# Patient Record
Sex: FEMALE | Race: OTHER | HISPANIC OR LATINO | URBAN - METROPOLITAN AREA
[De-identification: names, ages, dates, MRNs, and addresses within clinical notes are randomized per-mention and may not be internally consistent; named-entity substitution may affect disease eponyms.]

---

## 2017-06-29 ENCOUNTER — EMERGENCY (EMERGENCY)
Facility: HOSPITAL | Age: 82
LOS: 1 days | Discharge: PSYCHIATRIC OR SUBSTANCE USE FACILITY | End: 2017-06-29
Attending: EMERGENCY MEDICINE | Admitting: HOSPITALIST
Payer: MEDICAID

## 2017-06-29 VITALS
OXYGEN SATURATION: 100 % | SYSTOLIC BLOOD PRESSURE: 186 MMHG | TEMPERATURE: 99 F | RESPIRATION RATE: 18 BRPM | DIASTOLIC BLOOD PRESSURE: 87 MMHG | HEART RATE: 64 BPM

## 2017-06-29 DIAGNOSIS — Z87.898 PERSONAL HISTORY OF OTHER SPECIFIED CONDITIONS: Chronic | ICD-10-CM

## 2017-06-29 DIAGNOSIS — Z90.49 ACQUIRED ABSENCE OF OTHER SPECIFIED PARTS OF DIGESTIVE TRACT: Chronic | ICD-10-CM

## 2017-06-29 LAB
ALBUMIN SERPL ELPH-MCNC: 4.4 G/DL — SIGNIFICANT CHANGE UP (ref 3.3–5)
ALP SERPL-CCNC: 72 U/L — SIGNIFICANT CHANGE UP (ref 40–120)
ALT FLD-CCNC: 11 U/L — SIGNIFICANT CHANGE UP (ref 4–33)
APTT BLD: 36.8 SEC — SIGNIFICANT CHANGE UP (ref 27.5–37.4)
AST SERPL-CCNC: 18 U/L — SIGNIFICANT CHANGE UP (ref 4–32)
BASOPHILS # BLD AUTO: 0.02 K/UL — SIGNIFICANT CHANGE UP (ref 0–0.2)
BASOPHILS NFR BLD AUTO: 0.4 % — SIGNIFICANT CHANGE UP (ref 0–2)
BILIRUB SERPL-MCNC: 1.4 MG/DL — HIGH (ref 0.2–1.2)
BUN SERPL-MCNC: 12 MG/DL — SIGNIFICANT CHANGE UP (ref 7–23)
CALCIUM SERPL-MCNC: 9.2 MG/DL — SIGNIFICANT CHANGE UP (ref 8.4–10.5)
CHLORIDE SERPL-SCNC: 106 MMOL/L — SIGNIFICANT CHANGE UP (ref 98–107)
CO2 SERPL-SCNC: 26 MMOL/L — SIGNIFICANT CHANGE UP (ref 22–31)
CREAT SERPL-MCNC: 0.75 MG/DL — SIGNIFICANT CHANGE UP (ref 0.5–1.3)
EOSINOPHIL # BLD AUTO: 0.16 K/UL — SIGNIFICANT CHANGE UP (ref 0–0.5)
EOSINOPHIL NFR BLD AUTO: 3.1 % — SIGNIFICANT CHANGE UP (ref 0–6)
GLUCOSE SERPL-MCNC: 110 MG/DL — HIGH (ref 70–99)
HCT VFR BLD CALC: 39.6 % — SIGNIFICANT CHANGE UP (ref 34.5–45)
HGB BLD-MCNC: 13.3 G/DL — SIGNIFICANT CHANGE UP (ref 11.5–15.5)
IMM GRANULOCYTES # BLD AUTO: 0.02 # — SIGNIFICANT CHANGE UP
IMM GRANULOCYTES NFR BLD AUTO: 0.4 % — SIGNIFICANT CHANGE UP (ref 0–1.5)
INR BLD: 0.96 — SIGNIFICANT CHANGE UP (ref 0.88–1.17)
LYMPHOCYTES # BLD AUTO: 1.93 K/UL — SIGNIFICANT CHANGE UP (ref 1–3.3)
LYMPHOCYTES # BLD AUTO: 37.9 % — SIGNIFICANT CHANGE UP (ref 13–44)
MCHC RBC-ENTMCNC: 33.6 % — SIGNIFICANT CHANGE UP (ref 32–36)
MCHC RBC-ENTMCNC: 34 PG — SIGNIFICANT CHANGE UP (ref 27–34)
MCV RBC AUTO: 101.3 FL — HIGH (ref 80–100)
MONOCYTES # BLD AUTO: 0.56 K/UL — SIGNIFICANT CHANGE UP (ref 0–0.9)
MONOCYTES NFR BLD AUTO: 11 % — SIGNIFICANT CHANGE UP (ref 2–14)
NEUTROPHILS # BLD AUTO: 2.4 K/UL — SIGNIFICANT CHANGE UP (ref 1.8–7.4)
NEUTROPHILS NFR BLD AUTO: 47.2 % — SIGNIFICANT CHANGE UP (ref 43–77)
NRBC # FLD: 0 — SIGNIFICANT CHANGE UP
PLATELET # BLD AUTO: 145 K/UL — LOW (ref 150–400)
PMV BLD: 10.7 FL — SIGNIFICANT CHANGE UP (ref 7–13)
POTASSIUM SERPL-MCNC: 4.4 MMOL/L — SIGNIFICANT CHANGE UP (ref 3.5–5.3)
POTASSIUM SERPL-SCNC: 4.4 MMOL/L — SIGNIFICANT CHANGE UP (ref 3.5–5.3)
PROT SERPL-MCNC: 7.1 G/DL — SIGNIFICANT CHANGE UP (ref 6–8.3)
PROTHROM AB SERPL-ACNC: 10.8 SEC — SIGNIFICANT CHANGE UP (ref 9.8–13.1)
RBC # BLD: 3.91 M/UL — SIGNIFICANT CHANGE UP (ref 3.8–5.2)
RBC # FLD: 12.3 % — SIGNIFICANT CHANGE UP (ref 10.3–14.5)
SODIUM SERPL-SCNC: 145 MMOL/L — SIGNIFICANT CHANGE UP (ref 135–145)
TSH SERPL-MCNC: 2.87 UIU/ML — SIGNIFICANT CHANGE UP (ref 0.27–4.2)
WBC # BLD: 5.09 K/UL — SIGNIFICANT CHANGE UP (ref 3.8–10.5)
WBC # FLD AUTO: 5.09 K/UL — SIGNIFICANT CHANGE UP (ref 3.8–10.5)

## 2017-06-29 PROCEDURE — 99285 EMERGENCY DEPT VISIT HI MDM: CPT

## 2017-06-29 NOTE — ED PROVIDER NOTE - PROGRESS NOTE DETAILS
Pt stable. VSS. Imaging appreciated. Pt to be admitted to hospitalist. Neuro surg aware of patient. MAR and Dr. Burroughs aware.  Isaias Nielsen MD PGY-3

## 2017-06-29 NOTE — ED ADULT TRIAGE NOTE - CHIEF COMPLAINT QUOTE
dif seeing    pt has hx of pituitary tumor that is pressing on optic nerve...has some blurriness at baseline and episodes of worsened blurriness that usually resolve spontaneously... woke up today at 5am with blurriness that has not resolved...saw neurologist who sent her to ed ...requesting surgery.  has multiple neuro complaints - right sided weakness, headaches, b/l flank pain.

## 2017-06-29 NOTE — ED PROVIDER NOTE - ATTENDING CONTRIBUTION TO CARE
I performed a face-to-face evaluation of the patient and performed a history and physical examination. I agree with the history and physical examination.    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 P/w slowly-progressive decreased vision and general top-of-head HA. No trauma or F. Decreased appetite and early satiety. Appears well. Pupils reactive, but says she sees only light w/ either eye; can't make out fine shapes. Strong pulse. Respirations unlabored. No LE edema. Concern for symptomatic pituitary tumor. Check head CT for alternate explanation (eg, hydrocephalus). Admit.

## 2017-06-29 NOTE — ED ADULT NURSE NOTE - OBJECTIVE STATEMENT
Pt 82y female presents to ED c/o blurry vision. Pt Azeri speaking, family at bedside to translate. As per family pt has been dx with pituitary tumor that is pressing on the optic nerve, as per pt's family pt had it removed 20+ years ago, since then they said it has been growing each year but has recently pt has been having symptoms from the pressure on the optic nerve. Pt also c/o pain on right side of head, describes pain as "burning sensation", 8/10. IV place 20G in left AC, labs drawn and sent, will continue to monitor.

## 2017-06-29 NOTE — ED PROVIDER NOTE - OBJECTIVE STATEMENT
81yo female with TIA no residual deficit and pituitary tumor, depression presents with vision changes. Pt for the past few months has been losing her vision and this morning could not see anything. Pt went to her neurologist today and was sent to ED for pituitary tumor eval. Pt states she only see shadows. Pt also with ha, frontal parietal and loss of appetite. Pt denies cp/sob/palp, abd pain/v/d/n, urinary symptoms, head trauma, loc.

## 2017-06-29 NOTE — ED PROVIDER NOTE - MEDICAL DECISION MAKING DETAILS
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 8/16, Neuro said no surgery until Sx. P/w slowly-progressive decreased vision and general top-of-head HA. No trauma or F. Decreased appetite and early satiety. Appears well. Pupils reactive, but says she sees only light w/ either eye; can't make out fine shapes. Strong pulse. Respirations unlabored. No LE edema. Concern for symptomatic pituitary tumor. Check head CT for alternate explanation (eg, hydrocephalus). Admit.

## 2017-06-29 NOTE — ED ADULT NURSE NOTE - CHPI ED SYMPTOMS NEG
no loss of consciousness/no change in level of consciousness/no syncope/no numbness/no vomiting/no fever/no nausea/no weakness/no chills

## 2017-06-30 DIAGNOSIS — H54.7 UNSPECIFIED VISUAL LOSS: ICD-10-CM

## 2017-06-30 DIAGNOSIS — E23.7 DISORDER OF PITUITARY GLAND, UNSPECIFIED: ICD-10-CM

## 2017-06-30 DIAGNOSIS — Z29.9 ENCOUNTER FOR PROPHYLACTIC MEASURES, UNSPECIFIED: ICD-10-CM

## 2017-06-30 DIAGNOSIS — F32.9 MAJOR DEPRESSIVE DISORDER, SINGLE EPISODE, UNSPECIFIED: ICD-10-CM

## 2017-06-30 LAB
CORTIS SERPL-MCNC: 6.8 UG/DL — SIGNIFICANT CHANGE UP (ref 2.7–18.4)
FSH SERPL-MCNC: 22.3 IU/L — SIGNIFICANT CHANGE UP
LH SERPL-ACNC: 8.7 IU/L — SIGNIFICANT CHANGE UP
PROLACTIN SERPL-MCNC: 6.4 NG/ML — SIGNIFICANT CHANGE UP (ref 3.4–24.1)
PROLACTIN SERPL-MCNC: 8.2 NG/ML — SIGNIFICANT CHANGE UP (ref 3.4–24.1)
T3 SERPL-MCNC: 86.4 NG/DL — SIGNIFICANT CHANGE UP (ref 80–200)
T4 AB SER-ACNC: 5.27 UG/DL — SIGNIFICANT CHANGE UP (ref 5.1–13)

## 2017-06-30 RX ORDER — LANOLIN ALCOHOL/MO/W.PET/CERES
6 CREAM (GRAM) TOPICAL ONCE
Qty: 0 | Refills: 0 | Status: COMPLETED | OUTPATIENT
Start: 2017-06-30 | End: 2017-06-30

## 2017-06-30 RX ORDER — POLYETHYLENE GLYCOL 3350 17 G/17G
17 POWDER, FOR SOLUTION ORAL DAILY
Qty: 0 | Refills: 0 | Status: DISCONTINUED | OUTPATIENT
Start: 2017-06-30 | End: 2017-07-01

## 2017-06-30 RX ORDER — ACETAMINOPHEN 500 MG
650 TABLET ORAL ONCE
Qty: 0 | Refills: 0 | Status: COMPLETED | OUTPATIENT
Start: 2017-06-30 | End: 2017-06-30

## 2017-06-30 RX ORDER — HEPARIN SODIUM 5000 [USP'U]/ML
5000 INJECTION INTRAVENOUS; SUBCUTANEOUS EVERY 8 HOURS
Qty: 0 | Refills: 0 | Status: DISCONTINUED | OUTPATIENT
Start: 2017-06-30 | End: 2017-07-01

## 2017-06-30 RX ORDER — SENNA PLUS 8.6 MG/1
2 TABLET ORAL AT BEDTIME
Qty: 0 | Refills: 0 | Status: DISCONTINUED | OUTPATIENT
Start: 2017-06-30 | End: 2017-07-01

## 2017-06-30 RX ORDER — LANOLIN ALCOHOL/MO/W.PET/CERES
5 CREAM (GRAM) TOPICAL ONCE
Qty: 0 | Refills: 0 | Status: DISCONTINUED | OUTPATIENT
Start: 2017-06-30 | End: 2017-06-30

## 2017-06-30 RX ORDER — LANOLIN ALCOHOL/MO/W.PET/CERES
3 CREAM (GRAM) TOPICAL AT BEDTIME
Qty: 0 | Refills: 0 | Status: DISCONTINUED | OUTPATIENT
Start: 2017-06-30 | End: 2017-07-01

## 2017-06-30 RX ORDER — DOCUSATE SODIUM 100 MG
100 CAPSULE ORAL THREE TIMES A DAY
Qty: 0 | Refills: 0 | Status: DISCONTINUED | OUTPATIENT
Start: 2017-06-30 | End: 2017-07-01

## 2017-06-30 RX ORDER — ACETAMINOPHEN 500 MG
650 TABLET ORAL EVERY 6 HOURS
Qty: 0 | Refills: 0 | Status: DISCONTINUED | OUTPATIENT
Start: 2017-06-30 | End: 2017-07-01

## 2017-06-30 RX ORDER — ESCITALOPRAM OXALATE 10 MG/1
10 TABLET, FILM COATED ORAL DAILY
Qty: 0 | Refills: 0 | Status: DISCONTINUED | OUTPATIENT
Start: 2017-06-30 | End: 2017-07-01

## 2017-06-30 RX ORDER — DEXAMETHASONE 0.5 MG/5ML
10 ELIXIR ORAL ONCE
Qty: 0 | Refills: 0 | Status: COMPLETED | OUTPATIENT
Start: 2017-06-30 | End: 2017-06-30

## 2017-06-30 RX ADMIN — POLYETHYLENE GLYCOL 3350 17 GRAM(S): 17 POWDER, FOR SOLUTION ORAL at 13:18

## 2017-06-30 RX ADMIN — HEPARIN SODIUM 5000 UNIT(S): 5000 INJECTION INTRAVENOUS; SUBCUTANEOUS at 21:15

## 2017-06-30 RX ADMIN — Medication 102 MILLIGRAM(S): at 07:43

## 2017-06-30 RX ADMIN — ESCITALOPRAM OXALATE 10 MILLIGRAM(S): 10 TABLET, FILM COATED ORAL at 13:18

## 2017-06-30 RX ADMIN — Medication 100 MILLIGRAM(S): at 13:19

## 2017-06-30 RX ADMIN — Medication 6 MILLIGRAM(S): at 01:31

## 2017-06-30 RX ADMIN — HEPARIN SODIUM 5000 UNIT(S): 5000 INJECTION INTRAVENOUS; SUBCUTANEOUS at 13:18

## 2017-06-30 RX ADMIN — Medication 650 MILLIGRAM(S): at 05:07

## 2017-06-30 RX ADMIN — Medication 650 MILLIGRAM(S): at 04:44

## 2017-06-30 RX ADMIN — Medication 100 MILLIGRAM(S): at 21:14

## 2017-06-30 NOTE — H&P ADULT - NSHPPHYSICALEXAM_GEN_ALL_CORE
PHYSICAL EXAM:    Constitutional: Elderly female in NAD, lying in gurney.  Eyes: Unable to count fingers held at arms distance from face, unable to see provider's face, unable to read sign. Pupils reactive to light, does blink to threat bilaterally.   ENMT: MMM  Neck: supple  Back: spine non-tender  Respiratory: no respiratory distress on RA, CTAB  Cardiovascular: S1S2 RRR  Gastrointestinal: soft, non-tender +BS   Extremities: No LE edema, no cyanosis  Neurological: Follows commands, conversant in Ugandan, moving all extremities, able to ambulate with assistance, reports decreased sensation to light touch bilateral lower extremities  Skin: no rash  Psychiatric: calm

## 2017-06-30 NOTE — H&P ADULT - PROBLEM SELECTOR PLAN 3
- denies suicidal ideation  - will need to clarify what antidepressant she has been taking, does not know her pharmacy either...  - can attempt melatonin for insomnia

## 2017-06-30 NOTE — H&P ADULT - PROBLEM SELECTOR PLAN 1
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

## 2017-06-30 NOTE — H&P ADULT - HISTORY OF PRESENT ILLNESS
History obtained with assistance of Emirati telephone .     83 yo F with depression and progressive visual loss in setting of pituitary mass presenting with complaints of headaches and loss of vision, sent in by her neurologist for further evaluation. Reports that she has been having headaches for the last three months, localizes them to the front/top of her head, occurring "often", without associated nausea or vomiting, no alleviating or worsening factors, described as a "strange" burning sensation. Reports numbness to her knees bilaterally, but without weakness, no bowel/bladder incontinence. States her vision has also worsened, has been bad for approximately 1 year and 3 months, states things are "not clear", unable to see objects a short distance from her. States mass has been there for a while, but that doctors "in [place]" did not want to do anything about it. Lives with daughter, states ambulating OK.     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

## 2017-06-30 NOTE — H&P ADULT - NSHPLABSRESULTS_GEN_ALL_CORE
Labs:             13.3   5.09  )-----------( 145      ( 29 Jun 2017 22:15 )             39.6     06-29    145  |  106  |  12  ----------------------------<  110<H>  4.4   |  26  |  0.75    Ca    9.2      29 Jun 2017 22:15    TPro  7.1  /  Alb  4.4  /  TBili  1.4<H>  /  DBili  x   /  AST  18  /  ALT  11  /  AlkPhos  72  06-29    PT/INR - ( 29 Jun 2017 22:15 )   PT: 10.8 SEC;   INR: 0.96       PTT - ( 29 Jun 2017 22:15 )  PTT:36.8 SEC    Imaging:  < from: CT Head No Cont (06.29.17 @ 23:14) >    EXAM:  CT BRAIN      PROCEDURE DATE:  Jun 29 2017     INTERPRETATION:  CLINICAL INFORMATION: Visualized, history of pituitary   macroadenoma    TECHNIQUE: Noncontrast axial CT images were acquired through the head.   Two-dimensional sagittal and coronal reformats were generated.    COMPARISON STUDY: CT head 8/22/2016, MRI brain 8/24/2016.    FINDINGS: Again noted is a bilobed, partially calcified mass in the   sella/suprasellar region, displacing the optic chiasm superiorly. The   mass is essentially stable, measuring 2.3 x 1.6 x 3.0 cm (2.2 x 1.4 x 2.5   cm previously).  There is no acute intracranial hemorrhage, large   cortical infarct or midline shift. There are stable periventricular and   subcortical white matter lucencies, likely representing chronic   microvascular ischemic changes. Stable mild to moderate cerebral volume   loss with commensurate ventricular dilatation.    There is no displaced skull fracture. There is minimal mucosal   thickening. The tympanomastoid region is unremarkable. There is no   significant interval change.     IMPRESSION:     Sellar/suprasellar mass as described, unchanged since 8/22/2016. If   clinically indicated, follow-up contrast enhanced MRI would be helpful to   further evaluation.    < end of copied text >    Reviewed: Pituitary mass displacing optic chiasm, per read appears stable compared to prior imaging. Chronic microvascular ischemic changes.

## 2017-06-30 NOTE — PHYSICAL THERAPY INITIAL EVALUATION ADULT - PERTINENT HX OF CURRENT PROBLEM, REHAB EVAL
This is an 83 y/o F with depression and progressive visual loss in setting of pituitary mass presenting with complaints of headaches and loss of vision

## 2017-06-30 NOTE — PHYSICAL THERAPY INITIAL EVALUATION ADULT - GENERAL OBSERVATIONS, REHAB EVAL
Patient received semi supine in bed, in NAD ,Czech speaking ,but able to follow all demonstrations well without any c/o pain or discomfort.

## 2017-06-30 NOTE — CHART NOTE - NSCHARTNOTEFT_GEN_A_CORE
Able to reach pharmacy, patient picked up lexapro 10 mg recently. No longer on effexor, not on any other medications. Lexapro added....

## 2017-06-30 NOTE — H&P ADULT - ASSESSMENT
83 yo F with depression and progressive visual loss in setting of pituitary mass presenting with complaints of headaches and loss of vision, admitted for further evaluation. With pituitary mass on imaging, no acute neurosurgical intervention planned at this time...

## 2017-06-30 NOTE — CONSULT NOTE ADULT - SUBJECTIVE AND OBJECTIVE BOX
HPI:  81 yo female with PMH of pituitary mass presents for blurry vision and headaches x 3 months.    Pt has a known pituitary mass 22 years ago and underwent resection in [place]. 4 years ago, was told that the mass had grown back and is compressing the optic nerve. Pt was never told of any hormonal deficiencies in the past. Only medications she takes is an anti-depressant and Memantine-Donepezil. Now patient complains of headaches, visual loss and numbness in her knees.     So far hormonal workup showed prolactin 6.4, TSH 2.8, T4 5.27, LH 8.7, FSH 22.3 and cortisol 6.8.      PAST MEDICAL & SURGICAL HISTORY:  Pituitary mass  Depression  Transient cerebral ischemia, unspecified type  History of pituitary tumor  S/P cholecystectomy      FAMILY HISTORY:  No pertinent family history in first degree relatives      Social History: denies smoking or EtOH    Outpatient Medications:  Trintellix, Memantine, Donepezil    MEDICATIONS  (STANDING):  heparin  Injectable 5000 Unit(s) SubCutaneous every 8 hours  docusate sodium 100 milliGRAM(s) Oral three times a day  polyethylene glycol 3350 17 Gram(s) Oral daily  escitalopram 10 milliGRAM(s) Oral daily    MEDICATIONS  (PRN):  acetaminophen   Tablet. 650 milliGRAM(s) Oral every 6 hours PRN Mild Pain (1 - 3)  senna 2 Tablet(s) Oral at bedtime PRN Constipation  melatonin 3 milliGRAM(s) Oral at bedtime PRN Insomnia      Allergies    No Known Allergies    Intolerances      Review of Systems:  Constitutional: No fever  Eyes: +blurry vision  Neuro: No tremors, +HA  HEENT: No pain  Cardiovascular: No chest pain, palpitations  Respiratory: No SOB, no cough  GI: No nausea, vomiting, abdominal pain  : No dysuria  Skin: no rash  Psych: no depression  Endocrine: no polyuria, polydipsia  Hem/lymph: no swelling  Osteoporosis: no fractures    ALL OTHER SYSTEMS REVIEWED AND NEGATIVE    PHYSICAL EXAM:  VITALS: T(C): 36.6 (06-30-17 @ 10:59)  T(F): 97.8 (06-30-17 @ 10:59), Max: 98.8 (06-29-17 @ 20:38)  HR: 63 (06-30-17 @ 10:59) (56 - 68)  BP: 156/97 (06-30-17 @ 10:59) (156/97 - 199/72)  RR:  (16 - 18)  SpO2:  (97% - 100%)  Wt(kg): --  GENERAL: NAD, well-groomed, well-developed  EYES: No proptosis, no lid lag, anicteric  HEENT:  Atraumatic, Normocephalic, moist mucous membranes  THYROID: Normal size, no palpable nodules  RESPIRATORY: Clear to auscultation bilaterally; No rales, rhonchi, wheezing, or rubs  CARDIOVASCULAR: Regular rate and rhythm; No murmurs; no peripheral edema  GI: Soft, nontender, non distended, normal bowel sounds  SKIN: Dry, intact, No rashes or lesions  MUSCULOSKELETAL: Full range of motion, normal strength  NEURO: sensation intact, extraocular movements intact, no tremor, normal reflexes  PSYCH: Alert and oriented x 3, normal affect, normal mood  CUSHING'S SIGNS: no striae    CAPILLARY BLOOD GLUCOSE                            13.3   5.09  )-----------( 145      ( 29 Jun 2017 22:15 )             39.6       06-29    145  |  106  |  12  ----------------------------<  110<H>  4.4   |  26  |  0.75    EGFR if : 86  EGFR if non : 74    Ca    9.2      06-29    TPro  7.1  /  Alb  4.4  /  TBili  1.4<H>  /  DBili  x   /  AST  18  /  ALT  11  /  AlkPhos  72  06-29      Thyroid Function Tests:  06-30 @ 05:20 TSH -- FreeT4 -- T3 86.4 Anti TPO -- Anti Thyroglobulin Ab -- TSI --  06-29 @ 22:15 TSH 2.87 FreeT4 -- T3 -- Anti TPO -- Anti Thyroglobulin Ab -- TSI --        Radiology:     CT Head  FINDINGS: Again noted is a bilobed, partially calcified mass in the   sella/suprasellar region, displacing the optic chiasm superiorly. The   mass isessentially stable, measuring 2.3 x 1.6 x 3.0 cm (2.2 x 1.4 x 2.5   cm previously).     < end of copied text >

## 2017-06-30 NOTE — CONSULT NOTE ADULT - SUBJECTIVE AND OBJECTIVE BOX
83yo female with TIA no residual deficit and pituitary tumor, depression presents with vision changes. Pt for the past few months has been losing her vision and this morning could not see anything. Pt went to her neurologist today and was sent to ED for pituitary tumor eval. Pt states she only see shadows. Pt also with ha, frontal parietal and loss of appetite.    WDWN female in NAD  Vital Signs Last 24 Hrs  T(C): 37.1 (29 Jun 2017 20:38), Max: 37.1 (29 Jun 2017 20:38)  T(F): 98.8 (29 Jun 2017 20:38), Max: 98.8 (29 Jun 2017 20:38)  HR: 64 (29 Jun 2017 20:38) (64 - 64)  BP: 186/87 (29 Jun 2017 20:38) (186/87 - 186/87)  RR: 18 (29 Jun 2017 20:38) (18 - 18)  SpO2: 100% (29 Jun 2017 20:38) (100% - 100%)    AAO X 3 (Korean speaking)  PERRL, able to differentiate between light and dark only  MADRID strength 5/5 X 4                          13.3   5.09  )-----------( 145      ( 29 Jun 2017 22:15 )             39.6   06-29    145  |  106  |  12  ----------------------------<  110<H>  4.4   |  26  |  0.75    Ca    9.2      29 Jun 2017 22:15    TPro  7.1  /  Alb  4.4  /  TBili  1.4<H>  /  DBili  x   /  AST  18  /  ALT  11  /  AlkPhos  72  06-29    TSH 2.87    Noncontrast Brain CT: Again noted is a bilobed, partially calcified mass in the  sella/suprasellar region, displacing the optic chiasm superiorly. The mass  is essentially stable, measuring 2.3 x 1.6 x 3.0 cm (2.2 x 1.4 x 2.5 cm  previously). There is no acute intracranial hemorrhage, large cortical  infarct or midline shift. There are stable periventricular and subcortical  white matter lucencies, likely representing chronic microvascular ischemic  changes. Stable mild to moderate cerebral volume loss with commensurate  ventricular dilatation 81yo female with TIA no residual deficit and pituitary tumor, depression presents with vision changes. After discussing her case in [place]ian it became clear that the patient has been progressively losing her vision for two years. She was diagnosed with a pituitary tumor and did not have it resected because of problems with her insurance. She has only been able to see shadows for over a year.     Pt went to her neurologist today and was sent to ED for pituitary tumor eval. Pt states she only see shadows. Pt also with ha, frontal parietal and loss of appetite.  She has had global headaches for over a year and they are causing her significant distress.    WDWN female in NAD  Vital Signs Last 24 Hrs  T(C): 37.1 (29 Jun 2017 20:38), Max: 37.1 (29 Jun 2017 20:38)  T(F): 98.8 (29 Jun 2017 20:38), Max: 98.8 (29 Jun 2017 20:38)  HR: 64 (29 Jun 2017 20:38) (64 - 64)  BP: 186/87 (29 Jun 2017 20:38) (186/87 - 186/87)  RR: 18 (29 Jun 2017 20:38) (18 - 18)  SpO2: 100% (29 Jun 2017 20:38) (100% - 100%)    AAO X 3 (Danish speaking)  PERRL, able to differentiate between light and dark only  MADRID strength 5/5 X 4                          13.3   5.09  )-----------( 145      ( 29 Jun 2017 22:15 )             39.6   06-29    145  |  106  |  12  ----------------------------<  110<H>  4.4   |  26  |  0.75    Ca    9.2      29 Jun 2017 22:15    TPro  7.1  /  Alb  4.4  /  TBili  1.4<H>  /  DBili  x   /  AST  18  /  ALT  11  /  AlkPhos  72  06-29    TSH 2.87    Noncontrast Brain CT: Again noted is a bilobed, partially calcified mass in the  sella/suprasellar region, displacing the optic chiasm superiorly. The mass  is essentially stable, measuring 2.3 x 1.6 x 3.0 cm (2.2 x 1.4 x 2.5 cm  previously). There is no acute intracranial hemorrhage, large cortical  infarct or midline shift. There are stable periventricular and subcortical  white matter lucencies, likely representing chronic microvascular ischemic  changes. Stable mild to moderate cerebral volume loss with commensurate  ventricular dilatation

## 2017-06-30 NOTE — PROGRESS NOTE ADULT - SUBJECTIVE AND OBJECTIVE BOX
Case and films seen and discussed with Dr Manrique. Patient reportedly has had loss of vision since 2015 when she was diagnosed with this sellar lesion. Per patient she did not have the money or insurance for surgery and withheld getting treatment for this lesion.     PE- awake, alert, affect appropriate, Ox3  No visual fields, can differentiate light, cannot see colors     Will continue to follow,     please obtain endocrine and ophthomology consult  endocrine lab work   CTA w/wo contrast of the head with stereotactic protocol   no OR right now will need to follow closely

## 2017-06-30 NOTE — CONSULT NOTE ADULT - SUBJECTIVE AND OBJECTIVE BOX
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 No other ocular complaints. Have not seen an ophthalmologist.  POH: None  PMH: HTN  VA CF OU (ph 20/200, NI)  Pupils round and poorly reactive OU, no APD  IOP 14 OU  EOM full OU  CVF essentially full OU although difficult to assess given poor VA  PLE  LLA flat OU  C/S W+Q OU  K cl OU  Ac D+F OU  Iris flat OU  Lens NS OU  DFE: OD vitreous traction in macula OD w/o break. Otherwise m/n/v/p wnl OU, CD 0.4 OU, no optic nerve pallor or swelling.    CT: Bilobed, partially calcified mass in the sella/suprasellar region, displacing the optic chiasm superiorly. The mass is essentially stable (from 08/2016), measuring 2.3 x 1.6 x 3.0 cm (2.2 x 1.4 x 2.5 cm previously).  There is no acute intracranial hemorrhage, large cortical infarct or midline shift. 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 No other ocular complaints. Have not seen an ophthalmologist.  POH: None  PMH: HTN  VA CF OU (ph 20/200, NI)  Pupils round and poorly reactive OU, no APD  IOP 14 OU  EOM full OU  CVF essentially full OU although difficult to assess given poor VA  Unable to do color plates d/t poor VA. Red saturation decreased (sees red but describes as faded OU)  PLE  LLA flat OU  C/S W+Q OU  K cl OU  Ac D+F OU  Iris flat OU  Lens NS OU  DFE: OD vitreous traction in macula OD w/o break. Otherwise m/n/v/p wnl OU, CD 0.4 OU, no optic nerve pallor or swelling.    CT: Bilobed, partially calcified mass in the sella/suprasellar region, displacing the optic chiasm superiorly. The mass is essentially stable (from 08/2016), measuring 2.3 x 1.6 x 3.0 cm (2.2 x 1.4 x 2.5 cm previously).  There is no acute intracranial hemorrhage, large cortical infarct or midline shift.

## 2017-06-30 NOTE — H&P ADULT - PROBLEM SELECTOR PLAN 2
- has blurry vision for greater than 1 year in setting of pituitary mass with displacement of optic chiasm, unclear if any intervention will return vision    - f/u neurosx input  - ophthalmology evaluation  - no acute CVA noted on CT scan  - fall precautions

## 2017-06-30 NOTE — CONSULT NOTE ADULT - ASSESSMENT
A: 83 yo F with h/o sella mass for 15 years and gradually decreased vision OU. Has not seen an ophthalmologist. VA is poor.      Consider follow-up contrast enhanced MRI A: 83 yo F with h/o sella mass for 15 years and gradually decreased vision OU. Has not seen an ophthalmologist. VA is poor. No obvious field cuts. Decreased red saturation but normal appearing optic nerves. Area of vitreous traction on macula OD but no tear/hole/detachment.  P: Follow up with neuro-ophthalmology within 1 week of discharge: Dr Gutierrez, [phone]    Blane Mullins  [phone]  PGY2

## 2017-06-30 NOTE — H&P ADULT - NSHPREVIEWOFSYSTEMS_GEN_ALL_CORE
REVIEW OF SYSTEMS  General:	No fever/chills/weakness  Skin/Breast: no rash  Ophthalmologic: + progressive visual loss  ENMT: no dysphagia/sore throat	  Respiratory and Thorax: no cough/SOB  Cardiovascular: no CP/palpitations/LE edema	  Gastrointestinal: no nausea/vomiting/abdominal pain, +constipation	  Genitourinary: no dysuria/no incontinence	  Musculoskeletal: + left wrist and b/l elbow pain	  Neurological: + frequent headaches as per HPI, + numbness in lower extremities, no loss of strength, memory issues	  Psychiatric: + depression (does not recall what agent she is currently on, but reports no longer on venlafaxine), insomnia (takes unknown medicine she gets from [place] for it), denies suicidal ideation 	  Hematology/Lymphatics: no easy bleeding/bruising	  Endocrine: +pituitary mass	  Allergic/Immunologic: NKDA

## 2017-06-30 NOTE — CONSULT NOTE ADULT - ATTENDING COMMENTS
82F noted with pituitary macroadenoma, regrowth s/p prior resection 22 years ago, no known hormonal deficiencies.  Would proceed with MRI, optho eval for possible optic chiasm involvement. Recheck cortisol at 8AM with ACTH and check free T4.

## 2017-06-30 NOTE — CONSULT NOTE ADULT - PROBLEM SELECTOR RECOMMENDATION 9
Obtain prior surgical records, imaging, and pathology reports if possible  Brain and sella MRI with and without contrast  Ophthomology consult  Please send serum prolactin, LH, FSH, GH, IGF-1, AM cortisol, and T3, T4 levels

## 2017-06-30 NOTE — CONSULT NOTE ADULT - PROBLEM SELECTOR RECOMMENDATION 9
- repeat AM cortisol and ACTH  - IGF-1 pending  - consider prolactin dilution level - repeat AM cortisol and ACTH at 8am  - check free thyroxine level in am  - IGF-1 pending

## 2017-07-01 VITALS
HEART RATE: 58 BPM | OXYGEN SATURATION: 97 % | DIASTOLIC BLOOD PRESSURE: 86 MMHG | TEMPERATURE: 98 F | SYSTOLIC BLOOD PRESSURE: 146 MMHG | RESPIRATION RATE: 18 BRPM

## 2017-07-01 DIAGNOSIS — M25.569 PAIN IN UNSPECIFIED KNEE: ICD-10-CM

## 2017-07-01 LAB
BILIRUB DIRECT SERPL-MCNC: 0.3 MG/DL — HIGH (ref 0.1–0.2)
BILIRUB SERPL-MCNC: 2 MG/DL — HIGH (ref 0.2–1.2)
BUN SERPL-MCNC: 12 MG/DL — SIGNIFICANT CHANGE UP (ref 7–23)
CALCIUM SERPL-MCNC: 9.2 MG/DL — SIGNIFICANT CHANGE UP (ref 8.4–10.5)
CHLORIDE SERPL-SCNC: 104 MMOL/L — SIGNIFICANT CHANGE UP (ref 98–107)
CO2 SERPL-SCNC: 22 MMOL/L — SIGNIFICANT CHANGE UP (ref 22–31)
CORTIS SERPL-MCNC: 0.4 UG/DL — LOW (ref 2.7–18.4)
CREAT SERPL-MCNC: 0.63 MG/DL — SIGNIFICANT CHANGE UP (ref 0.5–1.3)
GH SERPL-MCNC: 0.05 NG/ML — SIGNIFICANT CHANGE UP
GLUCOSE SERPL-MCNC: 119 MG/DL — HIGH (ref 70–99)
HCT VFR BLD CALC: 39.1 % — SIGNIFICANT CHANGE UP (ref 34.5–45)
HGB BLD-MCNC: 13.4 G/DL — SIGNIFICANT CHANGE UP (ref 11.5–15.5)
IGF BP1 SERPL-MCNC: 64 NG/ML — SIGNIFICANT CHANGE UP (ref 34–172)
MCHC RBC-ENTMCNC: 33.5 PG — SIGNIFICANT CHANGE UP (ref 27–34)
MCHC RBC-ENTMCNC: 34.3 % — SIGNIFICANT CHANGE UP (ref 32–36)
MCV RBC AUTO: 97.8 FL — SIGNIFICANT CHANGE UP (ref 80–100)
NRBC # FLD: 0 — SIGNIFICANT CHANGE UP
PLATELET # BLD AUTO: 148 K/UL — LOW (ref 150–400)
PMV BLD: 11.3 FL — SIGNIFICANT CHANGE UP (ref 7–13)
POTASSIUM SERPL-MCNC: 3.7 MMOL/L — SIGNIFICANT CHANGE UP (ref 3.5–5.3)
POTASSIUM SERPL-SCNC: 3.7 MMOL/L — SIGNIFICANT CHANGE UP (ref 3.5–5.3)
RBC # BLD: 4 M/UL — SIGNIFICANT CHANGE UP (ref 3.8–5.2)
RBC # FLD: 12 % — SIGNIFICANT CHANGE UP (ref 10.3–14.5)
SODIUM SERPL-SCNC: 141 MMOL/L — SIGNIFICANT CHANGE UP (ref 135–145)
T4 FREE SERPL-MCNC: 1.06 NG/DL — SIGNIFICANT CHANGE UP (ref 0.9–1.8)
WBC # BLD: 10.74 K/UL — HIGH (ref 3.8–10.5)
WBC # FLD AUTO: 10.74 K/UL — HIGH (ref 3.8–10.5)

## 2017-07-01 RX ORDER — ESCITALOPRAM OXALATE 10 MG/1
1 TABLET, FILM COATED ORAL
Qty: 0 | Refills: 0 | COMMUNITY
Start: 2017-07-01

## 2017-07-01 RX ORDER — DOCUSATE SODIUM 100 MG
1 CAPSULE ORAL
Qty: 0 | Refills: 0 | COMMUNITY
Start: 2017-07-01

## 2017-07-01 RX ORDER — SENNA PLUS 8.6 MG/1
2 TABLET ORAL
Qty: 0 | Refills: 0 | COMMUNITY
Start: 2017-07-01

## 2017-07-01 RX ORDER — POLYETHYLENE GLYCOL 3350 17 G/17G
17 POWDER, FOR SOLUTION ORAL
Qty: 0 | Refills: 0 | COMMUNITY
Start: 2017-07-01

## 2017-07-01 RX ADMIN — HEPARIN SODIUM 5000 UNIT(S): 5000 INJECTION INTRAVENOUS; SUBCUTANEOUS at 05:53

## 2017-07-01 RX ADMIN — Medication 100 MILLIGRAM(S): at 05:53

## 2017-07-01 RX ADMIN — Medication 650 MILLIGRAM(S): at 12:06

## 2017-07-01 RX ADMIN — POLYETHYLENE GLYCOL 3350 17 GRAM(S): 17 POWDER, FOR SOLUTION ORAL at 11:35

## 2017-07-01 RX ADMIN — Medication 100 MILLIGRAM(S): at 14:51

## 2017-07-01 RX ADMIN — ESCITALOPRAM OXALATE 10 MILLIGRAM(S): 10 TABLET, FILM COATED ORAL at 11:36

## 2017-07-01 RX ADMIN — Medication 650 MILLIGRAM(S): at 11:36

## 2017-07-01 NOTE — PROGRESS NOTE ADULT - SUBJECTIVE AND OBJECTIVE BOX
Patient can f/u as outpatient, needs to schedule an appointment with opthamologist Dr. Gutierrez, and call on monday to schedule f/u with Dr. Manrique

## 2017-07-01 NOTE — DISCHARGE NOTE ADULT - CARE PROVIDER_API CALL
Sourav Gutierrez), Ophthalmology  [address] [place] [address]  Phone: [phone]  Fax: [phone]    Rayray Manrique), Neurosurgery  General  [address] [place] [address]  Phone: [phone]  Fax: [phone] Sourav Gutierrez), Ophthalmology  [address] [place] [address]  Phone: [phone]  Fax: [phone]    Rayray Manrique), Neurosurgery  General  [address] [place] [address]  Phone: [phone]  Fax: [phone]    ENDO CLINCI,   Phone: [phone]  Fax: (   )    -    ENDO CLINIC,   Phone: [phone]  Fax: (   )    -

## 2017-07-01 NOTE — DISCHARGE NOTE ADULT - CARE PLAN
Principal Discharge DX:	Vision loss  Goal:	continue care with follow up appointment  Instructions for follow-up, activity and diet:	Follow up with neuro-ophthalmology within 1 week of discharge: Dr Gutierrez, [phone]  Secondary Diagnosis:	Pituitary mass  Instructions for follow-up, activity and diet:	MRI revealed Stable sellar and suprasellar mass with mass effect upon the optic chiasm  and optic nerves are compared with prior study dated 8/24/2016    Follow up with Dr. Manrique for further evaluation and management. Please call to make an appointment within 1-2 weeks of discharge. Principal Discharge DX:	Vision loss  Goal:	continue care with follow up appointment  Instructions for follow-up, activity and diet:	Follow up with neuro-ophthalmology within 1 week of discharge: Dr Gutierrez, [phone]  Secondary Diagnosis:	Pituitary mass  Instructions for follow-up, activity and diet:	MRI revealed Stable sellar and suprasellar mass with mass effect upon the optic chiasm  and optic nerves are compared with prior study dated 8/24/2016    Follow up with Dr. Manrique for further evaluation and management. Please call to make an appointment within 1-2 weeks of discharge. Follow up with Endocrine for further evaluation and management. Principal Discharge DX:	Vision loss  Goal:	continue care with follow up appointment  Instructions for follow-up, activity and diet:	Follow up with neuro-ophthalmology within 1 week of discharge: Dr Gutierrez, [phone]  Secondary Diagnosis:	Pituitary mass  Instructions for follow-up, activity and diet:	MRI revealed Stable sellar and suprasellar mass with mass effect upon the optic chiasm  and optic nerves are compared with prior study dated 8/24/2016    Follow up with Dr. Manrique for further evaluation and management. Please call to make an appointment within 1-2 weeks of discharge. Follow up with Endocrine for further evaluation and management.

## 2017-07-01 NOTE — DISCHARGE NOTE ADULT - PROVIDER TOKENS
TOKEN:'[id number]',TOKEN:'[id number]' TOKEN:'[id number]:'[id number]',FREE:[LAST:[ENDO CLINCI],PHONE:[phone]],FAX:[(   )    -]],FREE:[LAST:[ENDO CLINIC],PHONE:[phone]],FAX:[(   )    -]]

## 2017-07-01 NOTE — PROGRESS NOTE ADULT - SUBJECTIVE AND OBJECTIVE BOX
Patient is a 61y old  Male who presents with a chief complaint of failure to thrive (02 Jun 2017 02:09)      SUBJECTIVE / OVERNIGHT EVENTS: no change in vision. denies HA. c/o B/L knee pain ongoing now for 3 years. exam and interview performed with  phone.     MEDICATIONS  (STANDING):  heparin  Injectable 5000 Unit(s) SubCutaneous every 12 hours  ferrous    sulfate 325 milliGRAM(s) Oral daily  folic acid 1 milliGRAM(s) Oral daily  calcium carbonate 1250 mG + Vitamin D (OsCal 500 + D) 1 Tablet(s) Oral two times a day  ascorbic acid 500 milliGRAM(s) Oral daily  multivitamin 1 Tablet(s) Oral daily  aspirin enteric coated 81 milliGRAM(s) Oral daily  dextrose 5%. 1000 milliLiter(s) (50 mL/Hr) IV Continuous <Continuous>  dextrose 50% Injectable 12.5 Gram(s) IV Push once  dextrose 50% Injectable 25 Gram(s) IV Push once  dextrose 50% Injectable 25 Gram(s) IV Push once  levETIRAcetam 500 milliGRAM(s) Oral two times a day  QUEtiapine 50 milliGRAM(s) Oral three times a day  LORazepam     Tablet 2 milliGRAM(s) Oral every 6 hours  senna 2 Tablet(s) Oral at bedtime  docusate sodium 100 milliGRAM(s) Oral three times a day    MEDICATIONS  (PRN):  dextrose Gel 1 Dose(s) Oral once PRN Blood Glucose LESS THAN 70 milliGRAM(s)/deciliter  glucagon  Injectable 1 milliGRAM(s) IntraMuscular once PRN Glucose LESS THAN 70 milligrams/deciliter  acetaminophen   Tablet 650 milliGRAM(s) Oral every 6 hours PRN For Temp greater than 38 C (100.4 F)  bisacodyl Suppository 10 milliGRAM(s) Rectal daily PRN Constipation  LORazepam     Tablet 2 milliGRAM(s) Oral every 6 hours PRN Agitation  LORazepam   Injectable 2 milliGRAM(s) IV Push every 6 hours PRN Agitation      T(C): 36.9 (07-01-17 @ 13:31)  HR: 112 (07-01-17 @ 13:31)  BP: 98/56 (07-01-17 @ 13:31)  RR: 20 (07-01-17 @ 13:31)  SpO2: 100% (07-01-17 @ 13:31)  CAPILLARY BLOOD GLUCOSE  100 (01 Jul 2017 07:41)        I&O's Summary      PHYSICAL EXAM:  GENERAL: NAD, well-developed, AOx3  HEAD:  Atraumatic, Normocephalic  EYES: EOMI, PERRL, conjunctiva and sclera clear  NECK: Supple, No JVD  CHEST/LUNG: Clear to auscultation bilaterally  HEART: Regular rate and rhythm; No murmurs, rubs, or gallops, No Edema  ABDOMEN: Soft, Nontender, Nondistended; Bowel sounds present  EXTREMITIES:  2+ Peripheral Pulses, No clubbing, cyanosis  PSYCH: No SI/HI  NEUROLOGY: non-focal  SKIN: No rashes or lesions    LABS:                        9.2    6.93  )-----------( 393      ( 01 Jul 2017 07:35 )             29.4     07-01    138  |  103  |  19  ----------------------------<  100<H>  4.9   |  20<L>  |  0.85    Ca    8.6      01 Jul 2017 07:35  Phos  3.6     07-01  Mg     2.1     07-01                    RADIOLOGY & ADDITIONAL TESTS:    Imaging Personally Reviewed:    Consultant(s) Notes Reviewed:      Care Discussed with Consultants/Other Providers:

## 2017-07-01 NOTE — DISCHARGE NOTE ADULT - MEDICATION SUMMARY - MEDICATIONS TO TAKE
I will START or STAY ON the medications listed below when I get home from the hospital:    escitalopram 10 mg oral tablet  -- 1 tab(s) by mouth once a day  -- Indication: For Depression    senna oral tablet  -- 2 tab(s) by mouth once a day (at bedtime), As needed, Constipation  -- Indication: For Need for prophylactic measure    docusate sodium 100 mg oral capsule  -- 1 cap(s) by mouth 3 times a day  -- Indication: For Need for prophylactic measure    polyethylene glycol 3350 oral powder for reconstitution  -- 17 gram(s) by mouth once a day  -- Indication: For Need for prophylactic measure

## 2017-07-01 NOTE — PROGRESS NOTE ADULT - PROBLEM SELECTOR PLAN 1
appreciate neurosx, opthal, and endo input. chronic mass effect. needs out-patient w/u. patient however does not have insurance therefore out-patient w/u and possible sx would be problematic. need to d.w family, SW and CM to address insurance issue prior to discharge,

## 2017-07-01 NOTE — DISCHARGE NOTE ADULT - PATIENT PORTAL LINK FT
“You can access the Follow[employer] Patient Portal, offered by [facility], by registering with the following website: [email or web address]”

## 2017-07-01 NOTE — DISCHARGE NOTE ADULT - PLAN OF CARE
Follow up with neuro-ophthalmology within 1 week of discharge: Dr Gutierrez, [phone] continue care with follow up appointment MRI revealed Stable sellar and suprasellar mass with mass effect upon the optic chiasm  and optic nerves are compared with prior study dated 8/24/2016    Follow up with Dr. Manrique for further evaluation and management. Please call to make an appointment within 1-2 weeks of discharge. MRI revealed Stable sellar and suprasellar mass with mass effect upon the optic chiasm  and optic nerves are compared with prior study dated 8/24/2016    Follow up with Dr. Manrique for further evaluation and management. Please call to make an appointment within 1-2 weeks of discharge. Follow up with Endocrine for further evaluation and management.

## 2017-07-01 NOTE — PROGRESS NOTE ADULT - ASSESSMENT
82 F with hx of pituitary mass s.p resection 22 years ago, with vision disturbances x 3 years found With recurrent sellar and suprasellar mass with mass effect on optic chiasm.

## 2017-07-01 NOTE — DISCHARGE NOTE ADULT - CARE PROVIDERS DIRECT ADDRESSES
,[email or web address],DirectAddress_Unknown,DirectAddress_Unknown

## 2017-07-01 NOTE — DISCHARGE NOTE ADULT - HOSPITAL COURSE
83 yo F with depression and progressive visual loss in setting of pituitary mass presenting with complaints of headaches and loss of vision, admitted for further evaluation. With pituitary mass on imaging, no acute neurosurgical intervention planned at this time.    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 To see Dr. Manrique in office    Vision loss.   - has blurry vision for greater than 1 year in setting of pituitary mass with displacement of optic chiasm, unclear if any intervention will return vision    - ophthalmology evaluation to follow up with Dr. Gutierrez as outpatient  - no acute CVA noted on CT scan  - fall precautions.     Depression.    - denies suicidal ideation    Pt stable for d/c home. Family aware of importance of follow up with Maria Luisa from medicaid office on Monday, appointment was set up for 7/3 on 6/30.

## 2017-07-03 LAB — ACTH SER-ACNC: 15 PG/ML — SIGNIFICANT CHANGE UP (ref 0–46)

## 2017-07-13 RX ORDER — ESCITALOPRAM OXALATE 10 MG/1
1 TABLET, FILM COATED ORAL
Qty: 0 | Refills: 0 | COMMUNITY

## 2017-07-15 PROBLEM — F32.9 MAJOR DEPRESSIVE DISORDER, SINGLE EPISODE, UNSPECIFIED: Chronic | Status: ACTIVE | Noted: 2017-06-29

## 2017-07-18 ENCOUNTER — INPATIENT (INPATIENT)
Facility: HOSPITAL | Age: 82
LOS: 7 days | Discharge: HOME | DRG: 614 | End: 2017-07-26
Attending: STUDENT IN AN ORGANIZED HEALTH CARE EDUCATION/TRAINING PROGRAM | Admitting: STUDENT IN AN ORGANIZED HEALTH CARE EDUCATION/TRAINING PROGRAM
Payer: MEDICAID

## 2017-07-18 VITALS
SYSTOLIC BLOOD PRESSURE: 123 MMHG | RESPIRATION RATE: 18 BRPM | DIASTOLIC BLOOD PRESSURE: 75 MMHG | OXYGEN SATURATION: 97 % | HEART RATE: 58 BPM | TEMPERATURE: 98 F

## 2017-07-18 DIAGNOSIS — E23.7 DISORDER OF PITUITARY GLAND, UNSPECIFIED: ICD-10-CM

## 2017-07-18 DIAGNOSIS — Z90.49 ACQUIRED ABSENCE OF OTHER SPECIFIED PARTS OF DIGESTIVE TRACT: Chronic | ICD-10-CM

## 2017-07-18 DIAGNOSIS — Z87.898 PERSONAL HISTORY OF OTHER SPECIFIED CONDITIONS: Chronic | ICD-10-CM

## 2017-07-18 LAB
ALBUMIN SERPL ELPH-MCNC: 4 G/DL — SIGNIFICANT CHANGE UP (ref 3.3–5)
ALP SERPL-CCNC: 67 U/L — SIGNIFICANT CHANGE UP (ref 40–120)
ALT FLD-CCNC: 12 U/L RC — SIGNIFICANT CHANGE UP (ref 10–45)
ANION GAP SERPL CALC-SCNC: 12 MMOL/L — SIGNIFICANT CHANGE UP (ref 5–17)
APTT BLD: 35.2 SEC — SIGNIFICANT CHANGE UP (ref 27.5–37.4)
AST SERPL-CCNC: 16 U/L — SIGNIFICANT CHANGE UP (ref 10–40)
BASOPHILS # BLD AUTO: 0 K/UL — SIGNIFICANT CHANGE UP (ref 0–0.2)
BASOPHILS NFR BLD AUTO: 0.4 % — SIGNIFICANT CHANGE UP (ref 0–2)
BILIRUB SERPL-MCNC: 1.4 MG/DL — HIGH (ref 0.2–1.2)
BLD GP AB SCN SERPL QL: NEGATIVE — SIGNIFICANT CHANGE UP
BUN SERPL-MCNC: 15 MG/DL — SIGNIFICANT CHANGE UP (ref 7–23)
CALCIUM SERPL-MCNC: 9 MG/DL — SIGNIFICANT CHANGE UP (ref 8.4–10.5)
CHLORIDE SERPL-SCNC: 108 MMOL/L — SIGNIFICANT CHANGE UP (ref 96–108)
CO2 SERPL-SCNC: 24 MMOL/L — SIGNIFICANT CHANGE UP (ref 22–31)
CREAT SERPL-MCNC: 0.79 MG/DL — SIGNIFICANT CHANGE UP (ref 0.5–1.3)
EOSINOPHIL # BLD AUTO: 0.1 K/UL — SIGNIFICANT CHANGE UP (ref 0–0.5)
EOSINOPHIL NFR BLD AUTO: 1.9 % — SIGNIFICANT CHANGE UP (ref 0–6)
GLUCOSE SERPL-MCNC: 100 MG/DL — HIGH (ref 70–99)
HCT VFR BLD CALC: 37.9 % — SIGNIFICANT CHANGE UP (ref 34.5–45)
HGB BLD-MCNC: 13.2 G/DL — SIGNIFICANT CHANGE UP (ref 11.5–15.5)
INR BLD: 1.03 RATIO — SIGNIFICANT CHANGE UP (ref 0.88–1.16)
LYMPHOCYTES # BLD AUTO: 1.7 K/UL — SIGNIFICANT CHANGE UP (ref 1–3.3)
LYMPHOCYTES # BLD AUTO: 26.8 % — SIGNIFICANT CHANGE UP (ref 13–44)
MAGNESIUM SERPL-MCNC: 2.1 MG/DL — SIGNIFICANT CHANGE UP (ref 1.6–2.6)
MCHC RBC-ENTMCNC: 34.9 GM/DL — SIGNIFICANT CHANGE UP (ref 32–36)
MCHC RBC-ENTMCNC: 36 PG — HIGH (ref 27–34)
MCV RBC AUTO: 103 FL — HIGH (ref 80–100)
MONOCYTES # BLD AUTO: 0.6 K/UL — SIGNIFICANT CHANGE UP (ref 0–0.9)
MONOCYTES NFR BLD AUTO: 9.6 % — SIGNIFICANT CHANGE UP (ref 2–14)
NEUTROPHILS # BLD AUTO: 3.8 K/UL — SIGNIFICANT CHANGE UP (ref 1.8–7.4)
NEUTROPHILS NFR BLD AUTO: 61.2 % — SIGNIFICANT CHANGE UP (ref 43–77)
PHOSPHATE SERPL-MCNC: 3.3 MG/DL — SIGNIFICANT CHANGE UP (ref 2.5–4.5)
PLATELET # BLD AUTO: 143 K/UL — LOW (ref 150–400)
POTASSIUM SERPL-MCNC: 4.4 MMOL/L — SIGNIFICANT CHANGE UP (ref 3.5–5.3)
POTASSIUM SERPL-SCNC: 4.4 MMOL/L — SIGNIFICANT CHANGE UP (ref 3.5–5.3)
PROT SERPL-MCNC: 6.6 G/DL — SIGNIFICANT CHANGE UP (ref 6–8.3)
PROTHROM AB SERPL-ACNC: 11.2 SEC — SIGNIFICANT CHANGE UP (ref 9.8–12.7)
RBC # BLD: 3.67 M/UL — LOW (ref 3.8–5.2)
RBC # FLD: 11.5 % — SIGNIFICANT CHANGE UP (ref 10.3–14.5)
RH IG SCN BLD-IMP: POSITIVE — SIGNIFICANT CHANGE UP
SODIUM SERPL-SCNC: 144 MMOL/L — SIGNIFICANT CHANGE UP (ref 135–145)
WBC # BLD: 6.2 K/UL — SIGNIFICANT CHANGE UP (ref 3.8–10.5)
WBC # FLD AUTO: 6.2 K/UL — SIGNIFICANT CHANGE UP (ref 3.8–10.5)

## 2017-07-18 PROCEDURE — 99223 1ST HOSP IP/OBS HIGH 75: CPT

## 2017-07-18 PROCEDURE — 99285 EMERGENCY DEPT VISIT HI MDM: CPT

## 2017-07-18 PROCEDURE — 70553 MRI BRAIN STEM W/O & W/DYE: CPT | Mod: 26

## 2017-07-18 PROCEDURE — 70450 CT HEAD/BRAIN W/O DYE: CPT | Mod: 26

## 2017-07-18 RX ORDER — DIPHENHYDRAMINE HCL 50 MG
50 CAPSULE ORAL EVERY 4 HOURS
Qty: 0 | Refills: 0 | Status: DISCONTINUED | OUTPATIENT
Start: 2017-07-18 | End: 2017-07-20

## 2017-07-18 RX ORDER — POLYETHYLENE GLYCOL 3350 17 G/17G
17 POWDER, FOR SOLUTION ORAL DAILY
Qty: 0 | Refills: 0 | Status: DISCONTINUED | OUTPATIENT
Start: 2017-07-18 | End: 2017-07-20

## 2017-07-18 RX ORDER — SENNA PLUS 8.6 MG/1
2 TABLET ORAL AT BEDTIME
Qty: 0 | Refills: 0 | Status: DISCONTINUED | OUTPATIENT
Start: 2017-07-18 | End: 2017-07-20

## 2017-07-18 RX ORDER — SENNA PLUS 8.6 MG/1
2 TABLET ORAL AT BEDTIME
Qty: 0 | Refills: 0 | Status: DISCONTINUED | OUTPATIENT
Start: 2017-07-18 | End: 2017-07-18

## 2017-07-18 RX ORDER — ESCITALOPRAM OXALATE 10 MG/1
10 TABLET, FILM COATED ORAL DAILY
Qty: 0 | Refills: 0 | Status: DISCONTINUED | OUTPATIENT
Start: 2017-07-18 | End: 2017-07-20

## 2017-07-18 RX ORDER — DEXAMETHASONE 0.5 MG/5ML
10 ELIXIR ORAL EVERY 6 HOURS
Qty: 0 | Refills: 0 | Status: DISCONTINUED | OUTPATIENT
Start: 2017-07-18 | End: 2017-07-20

## 2017-07-18 RX ORDER — ACETAMINOPHEN 500 MG
650 TABLET ORAL EVERY 6 HOURS
Qty: 0 | Refills: 0 | Status: DISCONTINUED | OUTPATIENT
Start: 2017-07-18 | End: 2017-07-20

## 2017-07-18 RX ORDER — HYDRALAZINE HCL 50 MG
5 TABLET ORAL ONCE
Qty: 0 | Refills: 0 | Status: COMPLETED | OUTPATIENT
Start: 2017-07-18 | End: 2017-07-18

## 2017-07-18 RX ORDER — ZOLPIDEM TARTRATE 10 MG/1
5 TABLET ORAL AT BEDTIME
Qty: 0 | Refills: 0 | Status: DISCONTINUED | OUTPATIENT
Start: 2017-07-18 | End: 2017-07-18

## 2017-07-18 RX ORDER — ONDANSETRON 8 MG/1
4 TABLET, FILM COATED ORAL EVERY 6 HOURS
Qty: 0 | Refills: 0 | Status: DISCONTINUED | OUTPATIENT
Start: 2017-07-18 | End: 2017-07-20

## 2017-07-18 RX ORDER — DOCUSATE SODIUM 100 MG
100 CAPSULE ORAL THREE TIMES A DAY
Qty: 0 | Refills: 0 | Status: DISCONTINUED | OUTPATIENT
Start: 2017-07-18 | End: 2017-07-18

## 2017-07-18 RX ORDER — DOCUSATE SODIUM 100 MG
100 CAPSULE ORAL THREE TIMES A DAY
Qty: 0 | Refills: 0 | Status: DISCONTINUED | OUTPATIENT
Start: 2017-07-18 | End: 2017-07-20

## 2017-07-18 RX ADMIN — Medication 650 MILLIGRAM(S): at 17:55

## 2017-07-18 RX ADMIN — Medication 100 MILLIGRAM(S): at 22:07

## 2017-07-18 RX ADMIN — Medication 5 MILLIGRAM(S): at 21:00

## 2017-07-18 RX ADMIN — Medication 50 MILLIGRAM(S): at 23:33

## 2017-07-18 NOTE — ED PROVIDER NOTE - PHYSICAL EXAMINATION
Mahnaz Mathias M.D.:   patient awake alert seen lying on stretcher NAD .   LUNGS CTAB no wheeze no crackle.   CARD RRR no m/r/g.    Abdomen soft NT ND no rebound no guarding no CVA tenderness.   EXT WWP no edema no calf tenderness CV 2+DP/PT bilaterally.   neuro A&Ox3 generalized weakness in all extremities. senssensation intact. CN 3-12intact.    skin warm and dry no rash  HEENT: moist mucous membranes, pupils 2mm sluggish, EOMI

## 2017-07-18 NOTE — H&P ADULT - HISTORY OF PRESENT ILLNESS
p ([phone])     HPI:Mandi Vasques Welsh speaking 82 year old female s/p tsp for pituitary tumor 20 years ago. History of TIA several months ago, takes ASA 81 daily.  Presents with worsening vision.  Baseline was blurry vision but claims she can now only see shadows in the past 2 weeks in both eyes. No acute HA. History provided by grandson and daughter.    PAST MEDICAL HISTORY   Pituitary mass  Depression  Transient cerebral ischemia, unspecified type    PAST SURGICAL HISTORY   History of pituitary tumor  S/P cholecystectomy  No significant past surgical history        MEDICATIONS:  Anticoagulation: ASA 81 mg daily    Other:    REVIEW OF SYSTEMS:  Check here if all are normal other than Neurological [x]  General:  Eyes:  ENT:  Cardiac:  Respiratory:  GI:  Musculoskeletal:   Skin:  Neurologic:   Psychiatric:     PHYSICAL EXAMINATION:   T(C): 36.5 (07-18-17 @ 13:35), Max: 36.5 (07-18-17 @ 13:35)  HR: 58 (07-18-17 @ 13:35) (58 - 58)  BP: 123/75 (07-18-17 @ 13:35) (123/75 - 123/75)  RR: 18 (07-18-17 @ 13:35) (18 - 18)  SpO2: 97% (07-18-17 @ 13:35) (97% - 97%)  Wt(kg): --    General Examination:     Neurologic Examination:             Higher functions                 Normal [x]               Abnormal:      Cranial Nerves (ii-xii)           Normal []              Abnormal: 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, but sensitive to light bilaterally    Motor Exam                       Normal [x]              Abnormal:                               Sensory Exam                   Normal [x]              Abnormal:    Reflexes                            Normal [x]              Abnormal:     Coordination                      Normal [x]              Abnormal:    Other:     LABS:                        13.2   6.2   )-----------( 143      ( 18 Jul 2017 14:32 )             37.9     07-18    144  |  108  |  15  ----------------------------<  100<H>  4.4   |  24  |  0.79    Ca    9.0      18 Jul 2017 14:32  Phos  3.3     07-18  Mg     2.1     07-18    TPro  6.6  /  Alb  4.0  /  TBili  1.4<H>  /  DBili  x   /  AST  16  /  ALT  12  /  AlkPhos  67  07-18    PT/INR - ( 18 Jul 2017 14:32 )   PT: 11.2 sec;   INR: 1.03 ratio         PTT - ( 18 Jul 2017 14:32 )  PTT:35.2 sec      RADIOLOGY & ADDITIONAL STUDIES:  MRI head 6/30/17: 2.6x2.3x1.7 pituitary mass, unchanged from 8/2016 read.

## 2017-07-18 NOTE — ED ADULT NURSE NOTE - CHPI ED SYMPTOMS NEG
no vomiting/no fever/no weakness/no dizziness/no change in level of consciousness/no confusion/no loss of consciousness/no numbness/no nausea

## 2017-07-18 NOTE — ED ADULT NURSE REASSESSMENT NOTE - NS ED NURSE REASSESS COMMENT FT1
17.15 Pt is admitted has no bed  report given to Arabella Melendrez in Holding pt is stable to go to holding at thi time of care

## 2017-07-18 NOTE — ED PROVIDER NOTE - PROGRESS NOTE DETAILS
Pt remains stable in ED; per d/w neurosurgery service, patient to be admitted to their service for further management.  Robson

## 2017-07-18 NOTE — ED PROVIDER NOTE - SHIFT CHANGE DETAILS
I have received sign out on this patient, briefly: 81yo F with h/o pituitary tumor s/p resection and recurrence, presenting to ED with worsening vision; currently awaiting CT head and neurosurgery consultation; disposition pending imaging and d/w neurosurgery consultant.  Robson

## 2017-07-18 NOTE — ED ADULT NURSE NOTE - EENT WDL
Eyes with  visual disturbances.  Ears clean and dry and no hearing difficulties. Nose with pink mucosa and no drainage.  Mouth mucous membranes moist and pink.  No tenderness or swelling to throat or neck.

## 2017-07-18 NOTE — ED PROVIDER NOTE - ATTENDING CONTRIBUTION TO CARE
Attending MD Parrish:  I personally have seen and examined this patient.  Resident note reviewed and agree on plan of care and except where noted.  See HPI, PE, and MDM for details.       82F with pituitary mass presenting with progressive vision loss x 1 mo, plan for screening CT head, neurosurgery consult

## 2017-07-18 NOTE — ED ADULT NURSE NOTE - OBJECTIVE STATEMENT
Pt with known H/O pituitary tumor C/O Vision loss.. Pt had  pituitary tumor which was excised couple of years ago and she had better vision. over the few months her vision is deteriorating and now to the point she is unable to see anything and a per her family the tumor is regrowing. so her  neurologist called the pt here for possible  surgery & management . Pt is unable to see anything from both eyes. Right pupil size 2  reactive to light  left pupil sluggish to light &  size  2

## 2017-07-18 NOTE — H&P ADULT - ASSESSMENT
Mandi Vasques82 year old female with known pituitary tumor with worsening vision.    -Admit to [facility]  -Hold ASA/AC  -MRI with and without  -endocrine labs  -Surgery planned for thursday Mandi Vasques82 year old female with known pituitary tumor with worsening vision.    -Admit to [facility]  -Hold ASA/AC  -MRI with and without  -endocrine labs  -Surgery planned for thursday  -ophtho eval

## 2017-07-18 NOTE — ED PROVIDER NOTE - OBJECTIVE STATEMENT
Mahnaz Mathias M.D: 82yoF hx depression, pituitary tumor s/p resection 20yrsago, recurred 6 months ago, followed by Dr. Manrique ([facility]) p/w progressively worsening vision loss and weakness over the last few weeks. was told by [facility] 3 weeks ago to return to ER when symptoms worsened. pt states she can only see shadows now has no true visual acuity.

## 2017-07-18 NOTE — ED PROVIDER NOTE - MEDICAL DECISION MAKING DETAILS
Mahnaz Mathias M.D: 82yoF w. hx pituitary tumor w/ known optic N compression, p/w worsening vision and weakness. pt w/ poor-minimal visual acuity. willl obtain pre-op labs, ct head, NSG consult, dispo per NSG. concern for worsening tumor burden

## 2017-07-19 DIAGNOSIS — E23.7 DISORDER OF PITUITARY GLAND, UNSPECIFIED: ICD-10-CM

## 2017-07-19 DIAGNOSIS — R17 UNSPECIFIED JAUNDICE: ICD-10-CM

## 2017-07-19 DIAGNOSIS — F32.9 MAJOR DEPRESSIVE DISORDER, SINGLE EPISODE, UNSPECIFIED: ICD-10-CM

## 2017-07-19 DIAGNOSIS — D49.7 NEOPLASM OF UNSPECIFIED BEHAVIOR OF ENDOCRINE GLANDS AND OTHER PARTS OF NERVOUS SYSTEM: ICD-10-CM

## 2017-07-19 LAB
ACTH SER-ACNC: 19 PG/ML — SIGNIFICANT CHANGE UP (ref 0–46)
ANION GAP SERPL CALC-SCNC: 18 MMOL/L — HIGH (ref 5–17)
BASOPHILS # BLD AUTO: 0.01 K/UL — SIGNIFICANT CHANGE UP (ref 0–0.2)
BASOPHILS NFR BLD AUTO: 0.2 % — SIGNIFICANT CHANGE UP (ref 0–2)
CHLORIDE SERPL-SCNC: 108 MMOL/L — SIGNIFICANT CHANGE UP (ref 96–108)
CO2 SERPL-SCNC: 19 MMOL/L — LOW (ref 22–31)
CORTIS AM PEAK SERPL-MCNC: 2.3 UG/DL — LOW (ref 6–18.4)
EOSINOPHIL # BLD AUTO: 0 K/UL — SIGNIFICANT CHANGE UP (ref 0–0.5)
EOSINOPHIL NFR BLD AUTO: 0 % — SIGNIFICANT CHANGE UP (ref 0–6)
FSH SERPL-MCNC: 20.4 IU/L — SIGNIFICANT CHANGE UP
HCT VFR BLD CALC: 41.8 % — SIGNIFICANT CHANGE UP (ref 34.5–45)
HGB BLD-MCNC: 14.1 G/DL — SIGNIFICANT CHANGE UP (ref 11.5–15.5)
IMM GRANULOCYTES NFR BLD AUTO: 0 % — SIGNIFICANT CHANGE UP (ref 0–1.5)
LH SERPL-ACNC: 9.5 IU/L — SIGNIFICANT CHANGE UP
LYMPHOCYTES # BLD AUTO: 0.67 K/UL — LOW (ref 1–3.3)
LYMPHOCYTES # BLD AUTO: 14.8 % — SIGNIFICANT CHANGE UP (ref 13–44)
MCHC RBC-ENTMCNC: 33.5 PG — SIGNIFICANT CHANGE UP (ref 27–34)
MCHC RBC-ENTMCNC: 33.7 GM/DL — SIGNIFICANT CHANGE UP (ref 32–36)
MCV RBC AUTO: 99.3 FL — SIGNIFICANT CHANGE UP (ref 80–100)
MONOCYTES # BLD AUTO: 0.06 K/UL — SIGNIFICANT CHANGE UP (ref 0–0.9)
MONOCYTES NFR BLD AUTO: 1.3 % — LOW (ref 2–14)
NEUTROPHILS # BLD AUTO: 3.79 K/UL — SIGNIFICANT CHANGE UP (ref 1.8–7.4)
NEUTROPHILS NFR BLD AUTO: 83.7 % — HIGH (ref 43–77)
PLATELET # BLD AUTO: 170 K/UL — SIGNIFICANT CHANGE UP (ref 150–400)
POTASSIUM SERPL-MCNC: 4.4 MMOL/L — SIGNIFICANT CHANGE UP (ref 3.5–5.3)
POTASSIUM SERPL-SCNC: 4.4 MMOL/L — SIGNIFICANT CHANGE UP (ref 3.5–5.3)
PROLACTIN SERPL-MCNC: 9.5 NG/ML — SIGNIFICANT CHANGE UP (ref 3.4–24.1)
RBC # BLD: 4.21 M/UL — SIGNIFICANT CHANGE UP (ref 3.8–5.2)
RBC # FLD: 12.9 % — SIGNIFICANT CHANGE UP (ref 10.3–14.5)
SODIUM SERPL-SCNC: 145 MMOL/L — SIGNIFICANT CHANGE UP (ref 135–145)
TSH SERPL-MCNC: 1.92 UIU/ML — SIGNIFICANT CHANGE UP (ref 0.27–4.2)
WBC # BLD: 4.53 K/UL — SIGNIFICANT CHANGE UP (ref 3.8–10.5)
WBC # FLD AUTO: 4.53 K/UL — SIGNIFICANT CHANGE UP (ref 3.8–10.5)

## 2017-07-19 PROCEDURE — 99222 1ST HOSP IP/OBS MODERATE 55: CPT | Mod: GC

## 2017-07-19 PROCEDURE — 93010 ELECTROCARDIOGRAM REPORT: CPT

## 2017-07-19 PROCEDURE — 99223 1ST HOSP IP/OBS HIGH 75: CPT

## 2017-07-19 RX ORDER — SODIUM CHLORIDE 9 MG/ML
1000 INJECTION INTRAMUSCULAR; INTRAVENOUS; SUBCUTANEOUS
Qty: 0 | Refills: 0 | Status: DISCONTINUED | OUTPATIENT
Start: 2017-07-19 | End: 2017-07-20

## 2017-07-19 RX ORDER — PANTOPRAZOLE SODIUM 20 MG/1
40 TABLET, DELAYED RELEASE ORAL DAILY
Qty: 0 | Refills: 0 | Status: DISCONTINUED | OUTPATIENT
Start: 2017-07-19 | End: 2017-07-20

## 2017-07-19 RX ORDER — INSULIN LISPRO 100/ML
VIAL (ML) SUBCUTANEOUS
Qty: 0 | Refills: 0 | Status: DISCONTINUED | OUTPATIENT
Start: 2017-07-19 | End: 2017-07-20

## 2017-07-19 RX ADMIN — ESCITALOPRAM OXALATE 10 MILLIGRAM(S): 10 TABLET, FILM COATED ORAL at 14:50

## 2017-07-19 RX ADMIN — Medication 10 MILLIGRAM(S): at 15:06

## 2017-07-19 RX ADMIN — Medication 116 MILLIGRAM(S): at 00:10

## 2017-07-19 RX ADMIN — PANTOPRAZOLE SODIUM 40 MILLIGRAM(S): 20 TABLET, DELAYED RELEASE ORAL at 14:49

## 2017-07-19 RX ADMIN — Medication 10 MILLIGRAM(S): at 05:46

## 2017-07-19 RX ADMIN — POLYETHYLENE GLYCOL 3350 17 GRAM(S): 17 POWDER, FOR SOLUTION ORAL at 14:50

## 2017-07-19 RX ADMIN — Medication 100 MILLIGRAM(S): at 14:50

## 2017-07-19 RX ADMIN — Medication 100 MILLIGRAM(S): at 05:46

## 2017-07-19 RX ADMIN — Medication 2: at 22:58

## 2017-07-19 RX ADMIN — Medication 50 MILLIGRAM(S): at 23:45

## 2017-07-19 RX ADMIN — Medication 100 MILLIGRAM(S): at 22:58

## 2017-07-19 NOTE — PROGRESS NOTE ADULT - ASSESSMENT
ASSESSMENT AND PLAN: 82 year old female with prior surgical history of TSP removal 20 years ago. Presented overnight with worsening vision. Pt is only able to asses light.    Brain MRI done shows supracellar mass with mass effect on th optic chiasm and invasion of right cavernous sinus.     NEURO: Pt is scheduled for surgery with Dr. Manrique in AM     CV: Hx of TIA. was previously on ASA - held for preop     ENDO: Pt started on decadron 10Q6 for brain swelling.  To continue until OR in AM     HEME/ONC:                      DVT ppx: No SQL until after OR    GI:  Continue senna, colace and protonix added for GI ppx with high dose steroids.     DISCHARGE PLANNING: PT-p post OR ASSESSMENT AND PLAN: 82 year old female with prior surgical history of TSP removal 20 years ago. Presented overnight with worsening vision. Pt is only able to asses light.    Brain MRI done shows supracellar mass with mass effect on th optic chiasm and invasion of right cavernous sinus. Endocrine labs done LH, FSH , Prolactin WNL     NEURO: Pt is scheduled for surgery with Dr. Manrique in AM . Opthalmology consult pending.     CV: Hx of TIA. was previously on ASA - held for preop     ENDO: Pt started on decadron 10Q6 for brain swelling.  To continue until OR in AM. HISS added. Endo called for consult.      HEME/ONC:                      DVT ppx: No SQL until after OR    GI:  Continue senna, colace and protonix added for GI ppx with high dose steroids. NPO after midnight start IV fluids once NPO.     DISCHARGE PLANNING: PT-p post OR ASSESSMENT AND PLAN: 82 year old female with prior surgical history of TSP removal 20 years ago. Presented overnight with worsening vision. Pt is only able to asses light.    Brain MRI done shows supracellar mass with mass effect on th optic chiasm and invasion of right cavernous sinus. Endocrine labs done LH, FSH , Prolactin WNL. AM cortisol 2.3    NEURO: Pt is scheduled for surgery with Dr. Manrique in AM . Opthalmology consult pending.     CV: Hx of TIA. was previously on ASA - held for preop     ENDO: Pt started on decadron 10Q6 for brain swelling.  To continue until OR in AM. HISS added. Endo called for consult.      HEME/ONC:                      DVT ppx: No SQL until after OR    GI:  Continue senna, colace and protonix added for GI ppx with high dose steroids. NPO after midnight start IV fluids once NPO.     DISCHARGE PLANNING: PT-p post OR

## 2017-07-19 NOTE — CONSULT NOTE ADULT - SUBJECTIVE AND OBJECTIVE BOX
HPI:  82 year old W s/p tsp for pituitary tumor 20 years ago presents with worsening vision. Patient has had poor vision for past 3 years but it has worsened over past 2 weeks. Patient had MRI 6/2017 2.6X2.3X1.7cm pituitary mass unchanged from 8/2016.  MRI on admission revealed sellar mass compressing optic chiasm and nerves.       PAST MEDICAL HISTORY   Pituitary mass  Depression  Transient cerebral ischemia, unspecified type    PAST SURGICAL HISTORY   History of pituitary tumor  S/P cholecystectomy  No significant past surgical history                    PAST MEDICAL & SURGICAL HISTORY:  Pituitary mass  Depression  Transient cerebral ischemia, unspecified type  History of pituitary tumor  S/P cholecystectomy      FAMILY HISTORY:  No pertinent family history in first degree relatives      Social History:     Outpatient Medications:    MEDICATIONS  (STANDING):  polyethylene glycol 3350 17 Gram(s) Oral daily  docusate sodium 100 milliGRAM(s) Oral three times a day  escitalopram 10 milliGRAM(s) Oral daily  dexamethasone   Concentrate 10 milliGRAM(s) Oral every 6 hours  pantoprazole  Injectable 40 milliGRAM(s) IV Push daily  sodium chloride 0.9%. 1000 milliLiter(s) (60 mL/Hr) IV Continuous <Continuous>  insulin lispro (HumaLOG) corrective regimen sliding scale   SubCutaneous Before meals and at bedtime    MEDICATIONS  (PRN):  senna 2 Tablet(s) Oral at bedtime PRN Constipation  acetaminophen   Tablet 650 milliGRAM(s) Oral every 6 hours PRN For Temp greater than 38 C (100.4 F)  acetaminophen   Tablet. 650 milliGRAM(s) Oral every 6 hours PRN Mild Pain (1 - 3)  ondansetron Injectable 4 milliGRAM(s) IV Push every 6 hours PRN Nausea and/or Vomiting  diphenhydrAMINE   Capsule 50 milliGRAM(s) Oral every 4 hours PRN Insomnia      Allergies    No Known Allergies    Intolerances      Review of Systems:  Constitutional: No fever  Eyes: No blurry vision  Neuro: No tremors  HEENT: No pain  Cardiovascular: No chest pain, palpitations  Respiratory: No SOB, no cough  GI: No nausea, vomiting, abdominal pain  : No dysuria  Skin: no rash  Psych: no depression  Endocrine: no polyuria, polydipsia  Hem/lymph: no swelling  Osteoporosis: no fractures    ALL OTHER SYSTEMS REVIEWED AND NEGATIVE    UNABLE TO OBTAIN    PHYSICAL EXAM:  VITALS: T(C): 37.1 (07-19-17 @ 15:48)  T(F): 98.8 (07-19-17 @ 15:48), Max: 98.9 (07-19-17 @ 05:25)  HR: 72 (07-19-17 @ 15:48) (54 - 72)  BP: 126/69 (07-19-17 @ 15:48) (120/67 - 174/83)  RR:  (18 - 18)  SpO2:  (92% - 98%)  Wt(kg): --  GENERAL: NAD, well-groomed, well-developed  EYES: No proptosis, no lid lag, anicteric  HEENT:  Atraumatic, Normocephalic, moist mucous membranes  THYROID: Normal size, no palpable nodules  RESPIRATORY: Clear to auscultation bilaterally; No rales, rhonchi, wheezing, or rubs  CARDIOVASCULAR: Regular rate and rhythm; No murmurs; no peripheral edema  GI: Soft, nontender, non distended, normal bowel sounds  SKIN: Dry, intact, No rashes or lesions  MUSCULOSKELETAL: Full range of motion, normal strength  NEURO: sensation intact, extraocular movements intact, no tremor, normal reflexes  PSYCH: Alert and oriented x 3, normal affect, normal mood  CUSHING'S SIGNS: no striae    CAPILLARY BLOOD GLUCOSE                            14.1   4.53  )-----------( 170      ( 19 Jul 2017 08:31 )             41.8       07-19    145  |  108  |  x   ----------------------------<  x   4.4   |  19<L>  |  x     EGFR if : x   EGFR if non : x     Ca    9.0      07-18  Mg     2.1     07-18  Phos  3.3     07-18    TPro  6.6  /  Alb  4.0  /  TBili  1.4<H>  /  DBili  x   /  AST  16  /  ALT  12  /  AlkPhos  67  07-18      Thyroid Function Tests:  07-18 @ 20:08 TSH 1.92 FreeT4 -- T3 -- Anti TPO -- Anti Thyroglobulin Ab -- TSI --  07-01 @ 07:00 TSH -- FreeT4 1.06 T3 -- Anti TPO -- Anti Thyroglobulin Ab -- TSI --              Radiology: HPI:  82 year old W s/p tsp for pituitary tumor 20 years ago presents with worsening vision. Patient has had poor vision for past 3 years but it has worsened over past 2 weeks. Patient had MRI 6/2017 2.6X2.3X1.7cm pituitary mass unchanged from 8/2016.  MRI on admission revealed sellar mass compressing optic chiasm and nerves. Patient denies ever being on previous hormone replacement.      PAST MEDICAL HISTORY   Pituitary mass  Depression  Transient cerebral ischemia, unspecified type    PAST SURGICAL HISTORY   History of pituitary tumor  S/P cholecystectomy  No significant past surgical history      PAST MEDICAL & SURGICAL HISTORY:  Pituitary mass  Depression  Transient cerebral ischemia, unspecified type  History of pituitary tumor  S/P cholecystectomy      FAMILY HISTORY:  No pertinent family history in first degree relatives      Social History: Live alone    Outpatient Medications: Escitalopram    MEDICATIONS  (STANDING):  polyethylene glycol 3350 17 Gram(s) Oral daily  docusate sodium 100 milliGRAM(s) Oral three times a day  escitalopram 10 milliGRAM(s) Oral daily  dexamethasone   Concentrate 10 milliGRAM(s) Oral every 6 hours  pantoprazole  Injectable 40 milliGRAM(s) IV Push daily  sodium chloride 0.9%. 1000 milliLiter(s) (60 mL/Hr) IV Continuous <Continuous>  insulin lispro (HumaLOG) corrective regimen sliding scale   SubCutaneous Before meals and at bedtime    MEDICATIONS  (PRN):  senna 2 Tablet(s) Oral at bedtime PRN Constipation  acetaminophen   Tablet 650 milliGRAM(s) Oral every 6 hours PRN For Temp greater than 38 C (100.4 F)  acetaminophen   Tablet. 650 milliGRAM(s) Oral every 6 hours PRN Mild Pain (1 - 3)  ondansetron Injectable 4 milliGRAM(s) IV Push every 6 hours PRN Nausea and/or Vomiting  diphenhydrAMINE   Capsule 50 milliGRAM(s) Oral every 4 hours PRN Insomnia      Allergies    No Known Allergies    Intolerances      Review of Systems:  Constitutional: No fever  Eyes: + blurry vision +headache  Neuro: No tremors  HEENT: No pain  Cardiovascular: No chest pain, palpitations  Respiratory: No SOB, no cough  GI: No nausea, vomiting, abdominal pain  : No dysuria  Skin: no rash  Psych: no depression  Endocrine: no polyuria, polydipsia  Hem/lymph: no swelling  Osteoporosis: no fractures    ALL OTHER SYSTEMS REVIEWED AND NEGATIVE      PHYSICAL EXAM:  VITALS: T(C): 37.1 (07-19-17 @ 15:48)  T(F): 98.8 (07-19-17 @ 15:48), Max: 98.9 (07-19-17 @ 05:25)  HR: 72 (07-19-17 @ 15:48) (54 - 72)  BP: 126/69 (07-19-17 @ 15:48) (120/67 - 174/83)  RR:  (18 - 18)  SpO2:  (92% - 98%)  Wt(kg): --  GENERAL: NAD, well-groomed, well-developed  EYES: No proptosis, no lid lag, anicteric  HEENT:  Atraumatic, Normocephalic, moist mucous membranes  THYROID: Normal size, no palpable nodules  RESPIRATORY: Clear to auscultation bilaterally; No rales, rhonchi, wheezing, or rubs  CARDIOVASCULAR: Regular rate and rhythm; No murmurs; no peripheral edema  GI: Soft, nontender, non distended, normal bowel sounds  SKIN: Dry, intact, No rashes or lesions  MUSCULOSKELETAL: Full range of motion, normal strength  NEURO: sensation intact, extraocular movements intact, no tremor, normal reflexes  PSYCH: Alert and oriented x 3, normal affect, normal mood  CUSHING'S SIGNS: no striae    CAPILLARY BLOOD GLUCOSE                            14.1   4.53  )-----------( 170      ( 19 Jul 2017 08:31 )             41.8       07-19    145  |  108  |  x   ----------------------------<  x   4.4   |  19<L>  |  x     EGFR if : x   EGFR if non : x     Ca    9.0      07-18  Mg     2.1     07-18  Phos  3.3     07-18    TPro  6.6  /  Alb  4.0  /  TBili  1.4<H>  /  DBili  x   /  AST  16  /  ALT  12  /  AlkPhos  67  07-18      Thyroid Function Tests:  07-18 @ 20:08 TSH 1.92 FreeT4 -- T3 -- Anti TPO -- Anti Thyroglobulin Ab -- TSI --  07-01 @ 07:00 TSH -- FreeT4 1.06 T3 -- Anti TPO -- Anti Thyroglobulin Ab -- TSI --    AM cortisol 2.3  FT4 1.06  TSH 1.9  ACTH 15  FSH 20.4  LH 95  Prolactin 9.5  IGF-1 64

## 2017-07-19 NOTE — CONSULT NOTE ADULT - SUBJECTIVE AND OBJECTIVE BOX
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 No other ocular complaints. Scheduled for sellar mass excision tomorrow.  POH: None  PMH: HTN  NKDA    VA HM (ph NI)  Pupils round and poorly reactive OU, no APD  IOP 14 OU  EOM full OU  CVF OD inferior field cut, OS superotemporal quadrantanopia (unreliable 2/2 poor co-operation)  Red saturation decreased (sees red but describes as faded OU)  PLE  LLA flat OU  C/S W+Q OU  K cl OU  Ac D+F OU  Iris flat OU  Lens NS OU  DFE: OD vitreous traction in macula OD w/o break. Otherwise m/n/v/p wnl OU, CD 0.4 OU, no optic nerve pallor or swelling.    CT: Bilobed, partially calcified mass in the sella/suprasellar region, displacing the optic chiasm superiorly. The mass is essentially stable (from 08/2016), measuring 2.3 x 1.6 x 3.0 cm (2.2 x 1.4 x 2.5 cm previously).  There is no acute intracranial hemorrhage, large cortical infarct or midline shift. 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 No other ocular complaints. Scheduled for sellar mass excision tomorrow.    POH: None  PMH: HTN  NKDA    VA HM (ph NI)  Pupils round and poorly reactive OU, no APD OU  IOP 14 OU  EOM full OU  CVF OD inferior field cut, OS superotemporal quadrantanopia (unreliable 2/2 poor co-operation)  Red saturation decreased (sees red but describes as faded OU)  PLE  LLA flat OU  C/S W+Q OU  K cl OU  Ac D+F OU  Iris flat OU  Lens NS OU  DFE: OD vitreous traction in macula OD. Otherwise m/n/v/p wnl OU, CD 0.4 OU, no optic nerve pallor or swelling.    CT: Bilobed, partially calcified mass in the sella/suprasellar region, displacing the optic chiasm superiorly. The mass is essentially stable (from 08/2016), measuring 2.3 x 1.6 x 3.0 cm (2.2 x 1.4 x 2.5 cm previously).  There is no acute intracranial hemorrhage, large cortical infarct or midline shift.

## 2017-07-19 NOTE — CONSULT NOTE ADULT - ASSESSMENT
82F with hx pituitary tumor s/p resection 20 yrs ago with recurrence in 2015, TIA on aspirin, depression, a/w worsening vision.

## 2017-07-19 NOTE — CONSULT NOTE ADULT - SUBJECTIVE AND OBJECTIVE BOX
82F with hx pituitary tumor s/p resection 20 yrs ago with recurrence in 2015 has been experiencing changes in vision for the last HPI:  82F with hx pituitary tumor s/p resection 20 yrs ago with recurrence in 2015, TIA on aspirin, depression, has been experiencing changes in vision for the past few months but worsening in the last 2 weeks. Patient was hospitalized at [facility] in 6/2017 found to have stable sellar and suprasellar mass with mass effect upon the optic chiasm and optic nerves are compared with prior study dated 8/24/2016 on MRI brain. Patient was discharged home at that time with plans to follow up closely as outpatient. Per cristina, patient has diminished vision and mostly sees "shadow" but c/o not being able to see on the day of admission. Patient also has been having associated dizziness and unsteady gait. Patient endorses intermittent HA, chronic back, knee, hand and feet pain from arthritis. Denies chest pain, SOB, nausea, difficulty with urination or bowel movement.     Neuro: Dr. Glenda Sapp([facility]): [phone]    PAST MEDICAL & SURGICAL HISTORY:  Pituitary mass  Depression  Transient cerebral ischemia, unspecified type  History of pituitary tumor s/p resection  osteoarthritis  prolapsed bladder s/p bladder lifting  ? oophorectomy  S/P cholecystectomy      Review of Systems:   CONSTITUTIONAL: No fever, weight loss, or fatigue  HEENT: No sore throat, + vision changes  RESPIRATORY: No cough, wheezing, shortness of breath  CARDIOVASCULAR: No chest pain, palpitations, leg edema  GASTROINTESTINAL: No abdominal pain. No nausea, vomiting, diarrhea or constipation. No melena or hematochezia.  GENITOURINARY: No dysuria, frequency, hematuria  NEUROLOGICAL: + headaches  SKIN: No itching, burning, rashes, or lesions   MUSCULOSKELETAL: + chronic back, knee, hand, feet pain from arthritis  PSYCHIATRIC: + depression  HEME/LYMPH: No easy bruising, or bleeding gums      Allergies    No Known Allergies    Intolerances      Social History:   No tobacco or EtOH use    FAMILY HISTORY:  No pertinent family history in first degree relatives      MEDICATIONS  (STANDING):  polyethylene glycol 3350 17 Gram(s) Oral daily  docusate sodium 100 milliGRAM(s) Oral three times a day  escitalopram 10 milliGRAM(s) Oral daily  dexamethasone   Concentrate 10 milliGRAM(s) Oral every 6 hours  pantoprazole  Injectable 40 milliGRAM(s) IV Push daily  sodium chloride 0.9%. 1000 milliLiter(s) (60 mL/Hr) IV Continuous <Continuous>  insulin lispro (HumaLOG) corrective regimen sliding scale   SubCutaneous Before meals and at bedtime    MEDICATIONS  (PRN):  senna 2 Tablet(s) Oral at bedtime PRN Constipation  acetaminophen   Tablet 650 milliGRAM(s) Oral every 6 hours PRN For Temp greater than 38 C (100.4 F)  acetaminophen   Tablet. 650 milliGRAM(s) Oral every 6 hours PRN Mild Pain (1 - 3)  ondansetron Injectable 4 milliGRAM(s) IV Push every 6 hours PRN Nausea and/or Vomiting  diphenhydrAMINE   Capsule 50 milliGRAM(s) Oral every 4 hours PRN Insomnia    HOME MEDS:  aspirin 81mg  lexapro 10mg  trintellix 10mg  omega 3    Vital Signs Last 24 Hrs  T(C): 37.1 (07-19-17 @ 15:48), Max: 37.2 (07-19-17 @ 05:25)  HR: 72 (07-19-17 @ 15:48) (58 - 72)  BP: 126/69 (07-19-17 @ 15:48) (120/67 - 174/83)  RR: 18 (07-19-17 @ 15:48) (18 - 18)  SpO2: 92% (07-19-17 @ 15:48) (92% - 98%)  CAPILLARY BLOOD GLUCOSE      I&O's Summary    18 Jul 2017 07:01  -  19 Jul 2017 07:00  --------------------------------------------------------  IN: 200 mL / OUT: 0 mL / NET: 200 mL    19 Jul 2017 07:01  -  19 Jul 2017 18:12  --------------------------------------------------------  IN: 360 mL / OUT: 0 mL / NET: 360 mL        PHYSICAL EXAM:  GENERAL: NAD  HEENT: neck supple  LUNG: Clear to auscultation bilaterally; No wheeze  HEART: Regular rate and rhythm; No murmurs  ABDOMEN: Soft, Nontender, Nondistended; Bowel sounds present  EXTREMITIES: No leg edema  PSYCH: normal affect, calm  NEUROLOGY: AAO x3, moves all extremities   SKIN: warm and dry    LABS:                        14.1   4.53  )-----------( 170      ( 19 Jul 2017 08:31 )             41.8     07-19    145  |  108  |  x   ----------------------------<  x   4.4   |  19<L>  |  x     Ca    9.0      18 Jul 2017 14:32  Phos  3.3     07-18  Mg     2.1     07-18    TPro  6.6  /  Alb  4.0  /  TBili  1.4<H>  /  DBili  x   /  AST  16  /  ALT  12  /  AlkPhos  67  07-18    PT/INR - ( 18 Jul 2017 14:32 )   PT: 11.2 sec;   INR: 1.03 ratio         PTT - ( 18 Jul 2017 14:32 )  PTT:35.2 sec      RADIOLOGY & ADDITIONAL TESTS:    Imaging Personally Reviewed:    < from: MRI Head w/wo Cont (07.18.17 @ 19:28) >  Impression:    Stable sellar and suprasellar mass with mass effect upon the optic chiasm   and optic nerves and invasion of the right cavernous sinus.  Right orbital dacryocystocele.  No acute infarct or hemorrhage.    < end of copied text >      Consultant(s) Notes Reviewed:      Care Discussed with Consultants/Other Providers: neurosurgery, endocrine HPI:  82F with hx pituitary tumor s/p resection 20 yrs ago with recurrence in 2015, TIA on aspirin, depression, has been experiencing changes in vision for the past few months but worsening in the last 2 weeks. Patient was hospitalized at [facility] in 6/2017 found to have stable sellar and suprasellar mass with mass effect upon the optic chiasm and optic nerves are compared with prior study dated 8/24/2016 on MRI brain. Patient was discharged home at that time with plans to follow up closely as outpatient. Per cristina, patient has diminished vision and mostly sees "shadow" but c/o not being able to see on the day of admission. Patient also has been having associated dizziness and unsteady gait. Patient endorses intermittent HA, chronic back, knee, hand and feet pain from arthritis. Denies chest pain, SOB, nausea, difficulty with urination or bowel movement.     Neuro: Dr. Glenda Sapp([facility]): [phone]    PAST MEDICAL & SURGICAL HISTORY:  Pituitary mass  Depression  Transient cerebral ischemia, unspecified type  History of pituitary tumor s/p resection  osteoarthritis  prolapsed bladder s/p bladder lifting  ? oophorectomy  S/P cholecystectomy      Review of Systems:   CONSTITUTIONAL: No fever, weight loss, or fatigue  HEENT: No sore throat, + vision changes  RESPIRATORY: No cough, wheezing, shortness of breath  CARDIOVASCULAR: No chest pain, palpitations, leg edema  GASTROINTESTINAL: No abdominal pain. No nausea, vomiting, diarrhea or constipation. No melena or hematochezia.  GENITOURINARY: No dysuria, frequency, hematuria  NEUROLOGICAL: + headaches  SKIN: No itching, burning, rashes, or lesions   MUSCULOSKELETAL: + chronic back, knee, hand, feet pain from arthritis  PSYCHIATRIC: + depression  HEME/LYMPH: No easy bruising, or bleeding gums      Allergies    No Known Allergies    Intolerances      Social History:   No tobacco or EtOH use    FAMILY HISTORY:  No pertinent family history in first degree relatives      MEDICATIONS  (STANDING):  polyethylene glycol 3350 17 Gram(s) Oral daily  docusate sodium 100 milliGRAM(s) Oral three times a day  escitalopram 10 milliGRAM(s) Oral daily  dexamethasone   Concentrate 10 milliGRAM(s) Oral every 6 hours  pantoprazole  Injectable 40 milliGRAM(s) IV Push daily  sodium chloride 0.9%. 1000 milliLiter(s) (60 mL/Hr) IV Continuous <Continuous>  insulin lispro (HumaLOG) corrective regimen sliding scale   SubCutaneous Before meals and at bedtime    MEDICATIONS  (PRN):  senna 2 Tablet(s) Oral at bedtime PRN Constipation  acetaminophen   Tablet 650 milliGRAM(s) Oral every 6 hours PRN For Temp greater than 38 C (100.4 F)  acetaminophen   Tablet. 650 milliGRAM(s) Oral every 6 hours PRN Mild Pain (1 - 3)  ondansetron Injectable 4 milliGRAM(s) IV Push every 6 hours PRN Nausea and/or Vomiting  diphenhydrAMINE   Capsule 50 milliGRAM(s) Oral every 4 hours PRN Insomnia    HOME MEDS:  aspirin 81mg  lexapro 10mg  trintellix 10mg  omega 3    Vital Signs Last 24 Hrs  T(C): 37.1 (07-19-17 @ 15:48), Max: 37.2 (07-19-17 @ 05:25)  HR: 72 (07-19-17 @ 15:48) (58 - 72)  BP: 126/69 (07-19-17 @ 15:48) (120/67 - 174/83)  RR: 18 (07-19-17 @ 15:48) (18 - 18)  SpO2: 92% (07-19-17 @ 15:48) (92% - 98%)  CAPILLARY BLOOD GLUCOSE      I&O's Summary    18 Jul 2017 07:01  -  19 Jul 2017 07:00  --------------------------------------------------------  IN: 200 mL / OUT: 0 mL / NET: 200 mL    19 Jul 2017 07:01  -  19 Jul 2017 18:12  --------------------------------------------------------  IN: 360 mL / OUT: 0 mL / NET: 360 mL        PHYSICAL EXAM:  GENERAL: NAD  HEENT: neck supple  LUNG: Clear to auscultation bilaterally; No wheeze  HEART: Regular rate and rhythm; No murmurs  ABDOMEN: Soft, Nontender, Nondistended; Bowel sounds present  EXTREMITIES: No leg edema  PSYCH: normal affect, calm  NEUROLOGY: AAO x3, moves all extremities   SKIN: warm and dry    LABS:                        14.1   4.53  )-----------( 170      ( 19 Jul 2017 08:31 )             41.8     07-19    145  |  108  |  x   ----------------------------<  x   4.4   |  19<L>  |  x     Ca    9.0      18 Jul 2017 14:32  Phos  3.3     07-18  Mg     2.1     07-18    TPro  6.6  /  Alb  4.0  /  TBili  1.4<H>  /  DBili  x   /  AST  16  /  ALT  12  /  AlkPhos  67  07-18    PT/INR - ( 18 Jul 2017 14:32 )   PT: 11.2 sec;   INR: 1.03 ratio         PTT - ( 18 Jul 2017 14:32 )  PTT:35.2 sec      RADIOLOGY & ADDITIONAL TESTS:    EKG tracing reviewed: NSR 74bpm, TWI III    Imaging Personally Reviewed:    < from: MRI Head w/wo Cont (07.18.17 @ 19:28) >  Impression:    Stable sellar and suprasellar mass with mass effect upon the optic chiasm   and optic nerves and invasion of the right cavernous sinus.  Right orbital dacryocystocele.  No acute infarct or hemorrhage.    < end of copied text >      Consultant(s) Notes Reviewed:      Care Discussed with Consultants/Other Providers: neurosurgery, endocrine

## 2017-07-19 NOTE — CONSULT NOTE ADULT - PROBLEM SELECTOR RECOMMENDATION 9
plan for OR tomorrow(transphenoidal resection of tumor)  follow up Endocrine recommendation  continue with decadron periop/postop period  monitor for symptom/sign of DI postop plan for OR tomorrow(transphenoidal resection of tumor). no medical contraindication for planned procedure at this time.   follow up Endocrine recommendation  continue with decadron periop/postop period  monitor for symptom/sign of DI postop

## 2017-07-19 NOTE — CONSULT NOTE ADULT - ASSESSMENT
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 VA is poor (HM OU). Field cuts defected but unreliable (inferior cut OD, superotemporal quadrantanopia OS). PERRL OU, no APD. Decreased red saturation but normal appearing optic nerves. Area of vitreous traction on macula OD but no tear/hole/detachment.  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 VA is poor (HM OU), decreased red saturation, field cuts detected but unreliable (inferior cut OD, superotemporal quadrantanopia OS). PERRL OU, no APD, normal appearing optic nerves. Sellar masses can compress the optic chiams or optic nerves thus in addition to the classic bitemporal field cuts of chiasmal compression, optic nerve compression can lead to related field cuts (altitudinal, central scotoma or arcuate). 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 VA is poor (HM OU), decreased red saturation, field cuts detected but unreliable (inferior cut OD, superotemporal quadrantanopia OS). PERRL OU, no APD, normal appearing optic nerves. Sellar masses can compress the optic chiasm or optic nerves thus in addition to the classic bitemporal field cuts of chiasmal compression, optic nerve compression can lead to related field cuts (altitudinal, central scotoma or arcuate). 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  Discussed with neuro-ophthalmology.    P: Follow up with neuro-ophthalmology within 1 week of discharge: Dr Gutierrez, [phone]    D/W Dr Will (attending)

## 2017-07-19 NOTE — CONSULT NOTE ADULT - ASSESSMENT
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

## 2017-07-19 NOTE — CONSULT NOTE ADULT - SUBJECTIVE AND OBJECTIVE BOX
CC: Pituitary Tumor.    HPI: Patient is a 82y old  Female who presents with a chief complaint of worsening vision, history of pituitary tumor (18 Jul 2017 16:23)  . We are asked to evaluate the patient for Transsphenoidal approach pituitary tumor resection. Pt. is s/p prior resection 20 years ago and sts that procedure was also performed transnasal.  Pt. is having worsening vision with light sensitivity b/l.  She notes occasional clear nasal discharge, scratchy throat that is worse when eating hard dry food such as chips, and some intermittent b/l ear fullness and muffled hearing.  No dizziness or feeling of spinning.  No n/v, no SOB, no chest pain.  No headache.    Pt. is Upper sorbian speaking.  Assessment performed with  phone,  ID # [id number]    PAST MEDICAL & SURGICAL HISTORY:  Pituitary mass  Depression  Transient cerebral ischemia, unspecified type  History of pituitary tumor  S/P cholecystectomy    Allergies    No Known Allergies    Intolerances      MEDICATIONS  (STANDING):  polyethylene glycol 3350 17 Gram(s) Oral daily  docusate sodium 100 milliGRAM(s) Oral three times a day  escitalopram 10 milliGRAM(s) Oral daily  dexamethasone   Concentrate 10 milliGRAM(s) Oral every 6 hours  pantoprazole  Injectable 40 milliGRAM(s) IV Push daily  sodium chloride 0.9%. 1000 milliLiter(s) (60 mL/Hr) IV Continuous <Continuous>  insulin lispro (HumaLOG) corrective regimen sliding scale   SubCutaneous Before meals and at bedtime    MEDICATIONS  (PRN):  senna 2 Tablet(s) Oral at bedtime PRN Constipation  acetaminophen   Tablet 650 milliGRAM(s) Oral every 6 hours PRN For Temp greater than 38 C (100.4 F)  acetaminophen   Tablet. 650 milliGRAM(s) Oral every 6 hours PRN Mild Pain (1 - 3)  ondansetron Injectable 4 milliGRAM(s) IV Push every 6 hours PRN Nausea and/or Vomiting  diphenhydrAMINE   Capsule 50 milliGRAM(s) Oral every 4 hours PRN Insomnia      Social History: No tobacco use    ROS: ENT, GI, , CV, Pulm, Neuro, Psych, MS, Heme, Endo, Constitutional; all negative except as noted in HPI    Vital Signs Last 24 Hrs  T(C): 37.1 (19 Jul 2017 19:45), Max: 37.2 (19 Jul 2017 05:25)  T(F): 98.8 (19 Jul 2017 19:45), Max: 98.9 (19 Jul 2017 05:25)  HR: 75 (19 Jul 2017 19:45) (62 - 75)  BP: 121/68 (19 Jul 2017 19:45) (120/67 - 143/74)  BP(mean): --  RR: 17 (19 Jul 2017 19:45) (17 - 18)  SpO2: 96% (19 Jul 2017 19:45) (92% - 96%)                          14.1   4.53  )-----------( 170      ( 19 Jul 2017 08:31 )             41.8    07-19    145  |  108  |  x   ----------------------------<  x   4.4   |  19<L>  |  x     Ca    9.0      18 Jul 2017 14:32  Phos  3.3     07-18  Mg     2.1     07-18    TPro  6.6  /  Alb  4.0  /  TBili  1.4<H>  /  DBili  x   /  AST  16  /  ALT  12  /  AlkPhos  67  07-18   PT/INR - ( 18 Jul 2017 14:32 )   PT: 11.2 sec;   INR: 1.03 ratio         PTT - ( 18 Jul 2017 14:32 )  PTT:35.2 sec        PHYSICAL EXAM:< from: CT Head No Cont (07.18.17 @ 16:15) >  FINDINGS:  Redemonstrated is a sellar and suprasellar mass with mass   effect upon the optic chiasm. This appears similar to the previous study.   Involvement of the right cavernous sinus is not as well visualized on   this examination. There is expansion of the sella, similar in appearance.    < end of copied text >    Gen: NAD, well-developed  Head: Normocephalic, Atraumatic  Face: no edema/erythema/fluctuance, parotid glands soft without mass  Eyes: PERRL, EOMI, no scleral injection  Ears: Right - ear canal clear, TM intact without effusion / erythema.  No evidence of any fluid drainage.  No Mastoid tenderness / erythema/ ear bulging            Left - ear canal clear, TM intact without effusion / erythema.  No evidence of any fluid drainage.  No Mastoid tenderness / erythema/ ear bulging  Nose: Nares bilaterally patent, no discharge  Mouth: No Stridor / Drooling / Trismus.  Mucosa moist, tongue/uvula midline, oropharynx clear  Neck: Flat, supple, no lymphadenopathy, trachea midline, no masses  Resp: breathing easily, no stridor  CV: no peripheral edema/cyanosis

## 2017-07-19 NOTE — CONSULT NOTE ADULT - PROBLEM SELECTOR RECOMMENDATION 2
patient had serum cortisol level of patient had borderline serum cortisol level in 6/2017  continue with steroid  will eventually need to be evaluated for AI after surgery (likely as outpatient)   Endo following and case d/w endo team

## 2017-07-19 NOTE — CONSULT NOTE ADULT - PROBLEM SELECTOR RECOMMENDATION 9
Plan for TSPR tomorrow with Dr. Manrique and Dr. Maldonado.  - NPO after Midnight  - Care a/p Neurosurgery.

## 2017-07-19 NOTE — PROGRESS NOTE ADULT - SUBJECTIVE AND OBJECTIVE BOX
PRE-OP NOTE    Procedure:   Attending:     HPI:  mary ([id number])     HPI:Mandi Vasques Ghanaian speaking 82 year old female s/p tsp for pituitary tumor 20 years ago. History of TIA several months ago, takes ASA 81 daily.  Presents with worsening vision.  Baseline was blurry vision but claims she can now only see shadows in the past 2 weeks in both eyes. No acute HA. History provided by grandson and daughter. OR tomorrow for TSP resection of pituitary tumor.    PAST MEDICAL HISTORY   Pituitary mass  Depression  Transient cerebral ischemia, unspecified type    PAST SURGICAL HISTORY   History of pituitary tumor  S/P cholecystectomy  No significant past surgical history        MEDICATIONS:  Anticoagulation: ASA 81 mg daily    Other:    REVIEW OF SYSTEMS:  Check here if all are normal other than Neurological [x]  General:  Eyes:  ENT:  Cardiac:  Respiratory:  GI:  Musculoskeletal:   Skin:  Neurologic:   Psychiatric:     PHYSICAL EXAMINATION:   T(C): 36.5 (07-18-17 @ 13:35), Max: 36.5 (07-18-17 @ 13:35)  HR: 58 (07-18-17 @ 13:35) (58 - 58)  BP: 123/75 (07-18-17 @ 13:35) (123/75 - 123/75)  RR: 18 (07-18-17 @ 13:35) (18 - 18)  SpO2: 97% (07-18-17 @ 13:35) (97% - 97%)  Wt(kg): --    General Examination:     Neurologic Examination:             Higher functions                 Normal [x]               Abnormal:      Cranial Nerves (ii-xii)           Normal []              Abnormal: 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, but sensitive to light bilaterally    Motor Exam                       Normal [x]              Abnormal:                               Sensory Exam                   Normal [x]              Abnormal:    Reflexes                            Normal [x]              Abnormal:     Coordination                      Normal [x]              Abnormal:    Other:     LABS:                        13.2   6.2   )-----------( 143      ( 18 Jul 2017 14:32 )             37.9     07-18    144  |  108  |  15  ----------------------------<  100<H>  4.4   |  24  |  0.79    Ca    9.0      18 Jul 2017 14:32  Phos  3.3     07-18  Mg     2.1     07-18    TPro  6.6  /  Alb  4.0  /  TBili  1.4<H>  /  DBili  x   /  AST  16  /  ALT  12  /  AlkPhos  67  07-18    PT/INR - ( 18 Jul 2017 14:32 )   PT: 11.2 sec;   INR: 1.03 ratio         PTT - ( 18 Jul 2017 14:32 )  PTT:35.2 sec      RADIOLOGY & ADDITIONAL STUDIES:  MRI head 6/30/17: 2.6x2.3x1.7 pituitary mass, unchanged from 8/2016 read. (18 Jul 2017 16:23)      T(C): 37.1 (07-19-17 @ 15:48), Max: 37.2 (07-19-17 @ 05:25)  HR: 72 (07-19-17 @ 15:48) (54 - 72)  BP: 126/69 (07-19-17 @ 15:48) (120/67 - 181/84)  RR: 18 (07-19-17 @ 15:48) (18 - 18)  SpO2: 92% (07-19-17 @ 15:48) (92% - 98%)  Wt(kg): --    07-19    145  |  108  |  x   ----------------------------<  x   4.4   |  19<L>  |  x     Ca    9.0      18 Jul 2017 14:32  Phos  3.3     07-18  Mg     2.1     07-18    TPro  6.6  /  Alb  4.0  /  TBili  1.4<H>  /  DBili  x   /  AST  16  /  ALT  12  /  AlkPhos  67  07-18    CBC Full  -  ( 19 Jul 2017 08:31 )  WBC Count : 4.53 K/uL  Hemoglobin : 14.1 g/dL  Hematocrit : 41.8 %  Platelet Count - Automated : 170 K/uL  Mean Cell Volume : 99.3 fl  Mean Cell Hemoglobin : 33.5 pg  Mean Cell Hemoglobin Concentration : 33.7 gm/dL  Auto Neutrophil # : 3.79 K/uL  Auto Lymphocyte # : 0.67 K/uL  Auto Monocyte # : 0.06 K/uL  Auto Eosinophil # : 0.00 K/uL  Auto Basophil # : 0.01 K/uL  Auto Neutrophil % : 83.7 %  Auto Lymphocyte % : 14.8 %  Auto Monocyte % : 1.3 %  Auto Eosinophil % : 0.0 %  Auto Basophil % : 0.2 %    PT/INR - ( 18 Jul 2017 14:32 )   PT: 11.2 sec;   INR: 1.03 ratio         PTT - ( 18 Jul 2017 14:32 )  PTT:35.2 sec    Pregnancy test: Not applicable due to age    Type & Screen (in past 72hrs):    Medical clearance: Done    Consent: In progress, wants to speak with Dr. Manrique. Hilaria aware.

## 2017-07-19 NOTE — CONSULT NOTE ADULT - ATTENDING COMMENTS
I have reviewed the residents note including the history, exam, assessment, and plan.  I agree with the residents assessment and plan.    Verónica Will MD
Agree with above. Plan for OR tomorrow with Dr. Manrique.
83 yo F with remote history of TSS for prior pituitary lesion (path unknown), now p/w recurrent growth >2 cm in sella after serial MRI monitoring outpt.  FSH and LH low for post menopause, suggesting that there is at least some component of poor pituitary production.  We are at this point most concerned about thyroid, cortisol, and DI axes.  FT4 low normal, cortisol uninterpretable in setting of dex use, and no sx of active DI.  She is concerned about the fact that she would like surgeon to be able to say with 100% certainly that surgery will not harm her vision.  Would contend that in absence of surgery, her vision could also be harmed.  Recommended she discuss this further with her surgeon however; we used Sami interpretor during encounter today.  If she goes for surgery, would continue steroids (dex appropriate for now, or alternately could use  mg hydrocort intra/rosie op), will check TFT's post op and agree with plan above for DI monitoring. As for cause, could be recurrent non functioning adenoma, also unlikely but mets from breast and lung cancer can occur (less likely with her h/o lesion prior).

## 2017-07-19 NOTE — PROGRESS NOTE ADULT - SUBJECTIVE AND OBJECTIVE BOX
HPI:  82 year old female s/p tsp for pituitary tumor 20 years ago. History of TIA several months ago, takes ASA 81 daily.  Presents with worsening vision.  Baseline was blurry vision but claims she can now only see shadows in the past 2 weeks in both eyes. No acute HA. Pt seen and examined, denies any complaints.     PAST MEDICAL HISTORY   Pituitary mass  Depression  Transient cerebral ischemia, unspecified type    PAST SURGICAL HISTORY   History of pituitary tumor  S/P cholecystectomy    LABS:                        13.2   6.2   )-----------( 143      ( 2017 14:32 )             37.9     07-18    144  |  108  |  15  ----------------------------<  100<H>  4.4   |  24  |  0.79    Ca    9.0      2017 14:32  Phos  3.3     0718  Mg     2.1     18    TPro  6.6  /  Alb  4.0  /  TBili  1.4<H>  /  DBili  x   /  AST  16  /  ALT  12  /  AlkPhos  67  07-18    PT/INR - ( 2017 14:32 )   PT: 11.2 sec;   INR: 1.03 ratio         PTT - ( 2017 14:32 )  PTT:35.2 sec      RADIOLOGY & ADDITIONAL STUDIES:  MRI head 17: 2.6x2.3x1.7 pituitary mass, unchanged from 2016 read. (2017 16:23)      Vital Signs Last 24 Hrs  T(C): 36.3 (2017 08:21), Max: 37.2 (2017 05:25)  T(F): 97.3 (2017 08:21), Max: 98.9 (2017 05:25)  HR: 67 (2017 08:21) (54 - 67)  BP: 123/75 (2017 08:21) (120/67 - 181/84)  BP(mean): --  RR: 18 (2017 08:21) (18 - 18)  SpO2: 96% (2017 08:21) (93% - 98%)      NEUROIMAGIN/18 MRI Brain: Stable sellar and suprasellar mass with mass effect upon the optic chiasm and optic nerves and invasion of the right cavernous sinus. Right orbital dacryocystocele. No acute infarct or hemorrhage.    PHYSICAL EXAM:    General: No Acute Distress     Neurological: Wallisian speaking. Awake, alert oriented to person, place and time, Following Commands, Sensitive to light b/l eyes but unable to see anything else. Face Symmetrical, Speech Fluent, Moving all extremities, Muscle Strength normal in all four extremities, No Drift, Sensation to Light Touch Intact    Pulmonary: Clear to Auscultation, No Rales, No Rhonchi, No Wheezes     Cardiovascular: S1, S2, Regular Rate and Rhythm     Gastrointestinal: Soft, Nontender, Nondistended     Extremities: No calf tenderness     MEDICATIONS:   Antibiotics:    Neuro:  escitalopram 10 milliGRAM(s) Oral daily  acetaminophen   Tablet 650 milliGRAM(s) Oral every 6 hours PRN For Temp greater than 38 C (100.4 F)  acetaminophen   Tablet. 650 milliGRAM(s) Oral every 6 hours PRN Mild Pain (1 - 3)    Anticoagulation:    Cardiology:    Endo:   dexamethasone   Concentrate 10 milliGRAM(s) Oral every 6 hours    Pulm:    GI/:  senna 2 Tablet(s) Oral at bedtime PRN  polyethylene glycol 3350 17 Gram(s) Oral daily  docusate sodium 100 milliGRAM(s) Oral three times a day  pantoprazole  Injectable 40 milliGRAM(s) IV Push daily  ondansetron Injectable 4 milliGRAM(s) IV Push every 6 hours PRN Nausea and/or Vomiting    Other:  diphenhydrAMINE   Capsule 50 milliGRAM(s) Oral every 4 hours PRN Insomnia HPI:  82 year old female s/p tsp for pituitary tumor 20 years ago. History of TIA several months ago, takes ASA 81 daily.  Presents with worsening vision.  Baseline was blurry vision but claims she can now only see shadows in the past 2 weeks in both eyes. No acute HA. Pt seen and examined, denies any complaints.     PAST MEDICAL HISTORY   Pituitary mass  Depression  Transient cerebral ischemia, unspecified type    PAST SURGICAL HISTORY   History of pituitary tumor  S/P cholecystectomy    LABS:                        13.2   6.2   )-----------( 143      ( 2017 14:32 )             37.9     07-18    144  |  108  |  15  ----------------------------<  100<H>  4.4   |  24  |  0.79    Ca    9.0      2017 14:32  Phos  3.3     0718  Mg     2.1     18    TPro  6.6  /  Alb  4.0  /  TBili  1.4<H>  /  DBili  x   /  AST  16  /  ALT  12  /  AlkPhos  67  07-18    PT/INR - ( 2017 14:32 )   PT: 11.2 sec;   INR: 1.03 ratio     PTT - ( 2017 14:32 )  PTT:35.2 sec    RADIOLOGY & ADDITIONAL STUDIES:  MRI head 17: 2.6x2.3x1.7 pituitary mass, unchanged from 2016 read. (2017 16:23)      Vital Signs Last 24 Hrs  T(C): 36.3 (2017 08:21), Max: 37.2 (2017 05:25)  T(F): 97.3 (2017 08:21), Max: 98.9 (2017 05:25)  HR: 67 (2017 08:21) (54 - 67)  BP: 123/75 (2017 08:21) (120/67 - 181/84)  BP(mean): --  RR: 18 (2017 08:21) (18 - 18)  SpO2: 96% (2017 08:21) (93% - 98%)      NEUROIMAGIN/18 MRI Brain: Stable sellar and suprasellar mass with mass effect upon the optic chiasm and optic nerves and invasion of the right cavernous sinus. Right orbital dacryocystocele. No acute infarct or hemorrhage.    PHYSICAL EXAM:    General: No Acute Distress     Neurological: Malaysian speaking. Awake, alert oriented to person, place and time, Following Commands, Sensitive to light b/l eyes but unable to see anything else. Face Symmetrical, Speech Fluent, Moving all extremities, Muscle Strength normal in all four extremities, No Drift, Sensation to Light Touch Intact    Pulmonary: Clear to Auscultation, No Rales, No Rhonchi, No Wheezes     Cardiovascular: S1, S2, Regular Rate and Rhythm     Gastrointestinal: Soft, Nontender, Nondistended     Extremities: No calf tenderness     MEDICATIONS:   Antibiotics:    Neuro:  escitalopram 10 milliGRAM(s) Oral daily  acetaminophen   Tablet 650 milliGRAM(s) Oral every 6 hours PRN For Temp greater than 38 C (100.4 F)  acetaminophen   Tablet. 650 milliGRAM(s) Oral every 6 hours PRN Mild Pain (1 - 3)    Anticoagulation:    Cardiology:    Endo:   dexamethasone   Concentrate 10 milliGRAM(s) Oral every 6 hours    Pulm:    GI/:  senna 2 Tablet(s) Oral at bedtime PRN  polyethylene glycol 3350 17 Gram(s) Oral daily  docusate sodium 100 milliGRAM(s) Oral three times a day  pantoprazole  Injectable 40 milliGRAM(s) IV Push daily  ondansetron Injectable 4 milliGRAM(s) IV Push every 6 hours PRN Nausea and/or Vomiting    Other:  diphenhydrAMINE   Capsule 50 milliGRAM(s) Oral every 4 hours PRN Insomnia

## 2017-07-19 NOTE — CONSULT NOTE ADULT - PROBLEM SELECTOR RECOMMENDATION 2
Unclear at this time as we cannot interpret the AM cortisol given that patient is on decadron. Would recommend stress dose steroids prior to surgery followed by gradual taper. Will check ACTH stimulation test after surgery.

## 2017-07-19 NOTE — CONSULT NOTE ADULT - CONSULT REASON
Pituitary tumor, going for transphenoidal pituitary resection.
pituitary tumor
preop clearance
Pituitary mass

## 2017-07-20 ENCOUNTER — TRANSCRIPTION ENCOUNTER (OUTPATIENT)
Age: 82
End: 2017-07-20

## 2017-07-20 ENCOUNTER — RESULT REVIEW (OUTPATIENT)
Age: 82
End: 2017-07-20

## 2017-07-20 LAB
ANION GAP SERPL CALC-SCNC: 13 MMOL/L — SIGNIFICANT CHANGE UP (ref 5–17)
ANION GAP SERPL CALC-SCNC: 17 MMOL/L — SIGNIFICANT CHANGE UP (ref 5–17)
ANION GAP SERPL CALC-SCNC: 19 MMOL/L — HIGH (ref 5–17)
APTT BLD: 31.1 SEC — SIGNIFICANT CHANGE UP (ref 27.5–37.4)
BUN SERPL-MCNC: 14 MG/DL — SIGNIFICANT CHANGE UP (ref 7–23)
BUN SERPL-MCNC: 16 MG/DL — SIGNIFICANT CHANGE UP (ref 7–23)
BUN SERPL-MCNC: 20 MG/DL — SIGNIFICANT CHANGE UP (ref 7–23)
CALCIUM SERPL-MCNC: 10 MG/DL — SIGNIFICANT CHANGE UP (ref 8.4–10.5)
CALCIUM SERPL-MCNC: 7.8 MG/DL — LOW (ref 8.4–10.5)
CALCIUM SERPL-MCNC: 8.1 MG/DL — LOW (ref 8.4–10.5)
CHLORIDE SERPL-SCNC: 105 MMOL/L — SIGNIFICANT CHANGE UP (ref 96–108)
CHLORIDE SERPL-SCNC: 110 MMOL/L — HIGH (ref 96–108)
CHLORIDE SERPL-SCNC: 111 MMOL/L — HIGH (ref 96–108)
CO2 SERPL-SCNC: 15 MMOL/L — LOW (ref 22–31)
CO2 SERPL-SCNC: 15 MMOL/L — LOW (ref 22–31)
CO2 SERPL-SCNC: 24 MMOL/L — SIGNIFICANT CHANGE UP (ref 22–31)
CREAT SERPL-MCNC: 0.71 MG/DL — SIGNIFICANT CHANGE UP (ref 0.5–1.3)
CREAT SERPL-MCNC: 0.72 MG/DL — SIGNIFICANT CHANGE UP (ref 0.5–1.3)
CREAT SERPL-MCNC: 0.79 MG/DL — SIGNIFICANT CHANGE UP (ref 0.5–1.3)
GLUCOSE SERPL-MCNC: 181 MG/DL — HIGH (ref 70–99)
GLUCOSE SERPL-MCNC: 232 MG/DL — HIGH (ref 70–99)
GLUCOSE SERPL-MCNC: 315 MG/DL — HIGH (ref 70–99)
HCT VFR BLD CALC: 36.9 % — SIGNIFICANT CHANGE UP (ref 34.5–45)
HCT VFR BLD CALC: 37.1 % — SIGNIFICANT CHANGE UP (ref 34.5–45)
HGB BLD-MCNC: 12.7 G/DL — SIGNIFICANT CHANGE UP (ref 11.5–15.5)
HGB BLD-MCNC: 12.8 G/DL — SIGNIFICANT CHANGE UP (ref 11.5–15.5)
INR BLD: 0.99 RATIO — SIGNIFICANT CHANGE UP (ref 0.88–1.16)
INSULIN-LIKE GROWTH FACTOR 1 INTERPRETATION: SIGNIFICANT CHANGE UP
INSULIN-LIKE GROWTH FACTOR 1: 83 NG/ML — SIGNIFICANT CHANGE UP (ref 17–186)
MAGNESIUM SERPL-MCNC: 1.8 MG/DL — SIGNIFICANT CHANGE UP (ref 1.6–2.6)
MAGNESIUM SERPL-MCNC: 1.8 MG/DL — SIGNIFICANT CHANGE UP (ref 1.6–2.6)
MCHC RBC-ENTMCNC: 34.3 GM/DL — SIGNIFICANT CHANGE UP (ref 32–36)
MCHC RBC-ENTMCNC: 34.6 GM/DL — SIGNIFICANT CHANGE UP (ref 32–36)
MCHC RBC-ENTMCNC: 35.7 PG — HIGH (ref 27–34)
MCHC RBC-ENTMCNC: 36 PG — HIGH (ref 27–34)
MCV RBC AUTO: 104 FL — HIGH (ref 80–100)
MCV RBC AUTO: 104 FL — HIGH (ref 80–100)
PHOSPHATE SERPL-MCNC: 2.6 MG/DL — SIGNIFICANT CHANGE UP (ref 2.5–4.5)
PLATELET # BLD AUTO: 156 K/UL — SIGNIFICANT CHANGE UP (ref 150–400)
PLATELET # BLD AUTO: 156 K/UL — SIGNIFICANT CHANGE UP (ref 150–400)
POTASSIUM SERPL-MCNC: 3.9 MMOL/L — SIGNIFICANT CHANGE UP (ref 3.5–5.3)
POTASSIUM SERPL-MCNC: 3.9 MMOL/L — SIGNIFICANT CHANGE UP (ref 3.5–5.3)
POTASSIUM SERPL-MCNC: 5 MMOL/L — SIGNIFICANT CHANGE UP (ref 3.5–5.3)
POTASSIUM SERPL-SCNC: 3.9 MMOL/L — SIGNIFICANT CHANGE UP (ref 3.5–5.3)
POTASSIUM SERPL-SCNC: 3.9 MMOL/L — SIGNIFICANT CHANGE UP (ref 3.5–5.3)
POTASSIUM SERPL-SCNC: 5 MMOL/L — SIGNIFICANT CHANGE UP (ref 3.5–5.3)
PROTHROM AB SERPL-ACNC: 10.8 SEC — SIGNIFICANT CHANGE UP (ref 9.8–12.7)
RBC # BLD: 3.55 M/UL — LOW (ref 3.8–5.2)
RBC # BLD: 3.56 M/UL — LOW (ref 3.8–5.2)
RBC # FLD: 11.7 % — SIGNIFICANT CHANGE UP (ref 10.3–14.5)
RBC # FLD: 11.7 % — SIGNIFICANT CHANGE UP (ref 10.3–14.5)
SODIUM SERPL-SCNC: 142 MMOL/L — SIGNIFICANT CHANGE UP (ref 135–145)
SODIUM SERPL-SCNC: 143 MMOL/L — SIGNIFICANT CHANGE UP (ref 135–145)
SODIUM SERPL-SCNC: 144 MMOL/L — SIGNIFICANT CHANGE UP (ref 135–145)
WBC # BLD: 13.8 K/UL — HIGH (ref 3.8–10.5)
WBC # BLD: 16.9 K/UL — HIGH (ref 3.8–10.5)
WBC # FLD AUTO: 13.8 K/UL — HIGH (ref 3.8–10.5)
WBC # FLD AUTO: 16.9 K/UL — HIGH (ref 3.8–10.5)

## 2017-07-20 PROCEDURE — 61782 SCAN PROC CRANIAL EXTRA: CPT

## 2017-07-20 PROCEDURE — 15576 PEDICLE E/N/E/L/NTRORAL: CPT

## 2017-07-20 PROCEDURE — 62165 REMOVE PITUIT TUMOR W/SCOPE: CPT | Mod: 62

## 2017-07-20 PROCEDURE — 88342 IMHCHEM/IMCYTCHM 1ST ANTB: CPT | Mod: 26,59

## 2017-07-20 PROCEDURE — 88311 DECALCIFY TISSUE: CPT | Mod: 26

## 2017-07-20 PROCEDURE — 88341 IMHCHEM/IMCYTCHM EA ADD ANTB: CPT | Mod: 26,59

## 2017-07-20 PROCEDURE — 99291 CRITICAL CARE FIRST HOUR: CPT

## 2017-07-20 PROCEDURE — 99254 IP/OBS CNSLTJ NEW/EST MOD 60: CPT | Mod: 57

## 2017-07-20 PROCEDURE — 88360 TUMOR IMMUNOHISTOCHEM/MANUAL: CPT | Mod: 26

## 2017-07-20 PROCEDURE — 62272 THER SPI PNXR DRG CSF: CPT

## 2017-07-20 PROCEDURE — 88305 TISSUE EXAM BY PATHOLOGIST: CPT | Mod: 26

## 2017-07-20 RX ORDER — PANTOPRAZOLE SODIUM 20 MG/1
40 TABLET, DELAYED RELEASE ORAL DAILY
Qty: 0 | Refills: 0 | Status: DISCONTINUED | OUTPATIENT
Start: 2017-07-20 | End: 2017-07-24

## 2017-07-20 RX ORDER — INSULIN HUMAN 100 [IU]/ML
0.5 INJECTION, SOLUTION SUBCUTANEOUS
Qty: 100 | Refills: 0 | Status: DISCONTINUED | OUTPATIENT
Start: 2017-07-20 | End: 2017-07-21

## 2017-07-20 RX ORDER — ESCITALOPRAM OXALATE 10 MG/1
10 TABLET, FILM COATED ORAL DAILY
Qty: 0 | Refills: 0 | Status: DISCONTINUED | OUTPATIENT
Start: 2017-07-20 | End: 2017-07-26

## 2017-07-20 RX ORDER — SENNA PLUS 8.6 MG/1
2 TABLET ORAL AT BEDTIME
Qty: 0 | Refills: 0 | Status: DISCONTINUED | OUTPATIENT
Start: 2017-07-20 | End: 2017-07-26

## 2017-07-20 RX ORDER — OXYCODONE HYDROCHLORIDE 5 MG/1
5 TABLET ORAL EVERY 8 HOURS
Qty: 0 | Refills: 0 | Status: DISCONTINUED | OUTPATIENT
Start: 2017-07-20 | End: 2017-07-25

## 2017-07-20 RX ORDER — ACETAMINOPHEN 500 MG
1000 TABLET ORAL ONCE
Qty: 0 | Refills: 0 | Status: COMPLETED | OUTPATIENT
Start: 2017-07-20 | End: 2017-07-20

## 2017-07-20 RX ORDER — DEXTROSE MONOHYDRATE, SODIUM CHLORIDE, AND POTASSIUM CHLORIDE 50; .745; 4.5 G/1000ML; G/1000ML; G/1000ML
1000 INJECTION, SOLUTION INTRAVENOUS
Qty: 0 | Refills: 0 | Status: DISCONTINUED | OUTPATIENT
Start: 2017-07-20 | End: 2017-07-22

## 2017-07-20 RX ORDER — DOCUSATE SODIUM 100 MG
100 CAPSULE ORAL THREE TIMES A DAY
Qty: 0 | Refills: 0 | Status: DISCONTINUED | OUTPATIENT
Start: 2017-07-20 | End: 2017-07-26

## 2017-07-20 RX ORDER — POLYETHYLENE GLYCOL 3350 17 G/17G
17 POWDER, FOR SOLUTION ORAL DAILY
Qty: 0 | Refills: 0 | Status: DISCONTINUED | OUTPATIENT
Start: 2017-07-20 | End: 2017-07-25

## 2017-07-20 RX ORDER — DEXAMETHASONE 0.5 MG/5ML
10 ELIXIR ORAL EVERY 6 HOURS
Qty: 0 | Refills: 0 | Status: DISCONTINUED | OUTPATIENT
Start: 2017-07-20 | End: 2017-07-21

## 2017-07-20 RX ORDER — ONDANSETRON 8 MG/1
4 TABLET, FILM COATED ORAL EVERY 6 HOURS
Qty: 0 | Refills: 0 | Status: DISCONTINUED | OUTPATIENT
Start: 2017-07-20 | End: 2017-07-26

## 2017-07-20 RX ORDER — DEXAMETHASONE 0.5 MG/5ML
10 ELIXIR ORAL EVERY 6 HOURS
Qty: 0 | Refills: 0 | Status: DISCONTINUED | OUTPATIENT
Start: 2017-07-20 | End: 2017-07-20

## 2017-07-20 RX ORDER — ACETAMINOPHEN 500 MG
650 TABLET ORAL EVERY 6 HOURS
Qty: 0 | Refills: 0 | Status: DISCONTINUED | OUTPATIENT
Start: 2017-07-20 | End: 2017-07-26

## 2017-07-20 RX ORDER — OXYCODONE HYDROCHLORIDE 5 MG/1
10 TABLET ORAL EVERY 8 HOURS
Qty: 0 | Refills: 0 | Status: DISCONTINUED | OUTPATIENT
Start: 2017-07-20 | End: 2017-07-25

## 2017-07-20 RX ORDER — CEFAZOLIN SODIUM 1 G
2000 VIAL (EA) INJECTION EVERY 8 HOURS
Qty: 0 | Refills: 0 | Status: COMPLETED | OUTPATIENT
Start: 2017-07-20 | End: 2017-07-21

## 2017-07-20 RX ORDER — HYDRALAZINE HCL 50 MG
5 TABLET ORAL ONCE
Qty: 0 | Refills: 0 | Status: COMPLETED | OUTPATIENT
Start: 2017-07-20 | End: 2017-07-20

## 2017-07-20 RX ORDER — INSULIN LISPRO 100/ML
VIAL (ML) SUBCUTANEOUS
Qty: 0 | Refills: 0 | Status: DISCONTINUED | OUTPATIENT
Start: 2017-07-20 | End: 2017-07-20

## 2017-07-20 RX ADMIN — Medication 100 MILLIGRAM(S): at 21:02

## 2017-07-20 RX ADMIN — SODIUM CHLORIDE 60 MILLILITER(S): 9 INJECTION INTRAMUSCULAR; INTRAVENOUS; SUBCUTANEOUS at 06:44

## 2017-07-20 RX ADMIN — Medication 10 MILLIGRAM(S): at 06:36

## 2017-07-20 RX ADMIN — Medication 2: at 21:15

## 2017-07-20 RX ADMIN — Medication 1000 MILLIGRAM(S): at 18:00

## 2017-07-20 RX ADMIN — Medication 400 MILLIGRAM(S): at 17:28

## 2017-07-20 RX ADMIN — OXYCODONE HYDROCHLORIDE 10 MILLIGRAM(S): 5 TABLET ORAL at 23:22

## 2017-07-20 RX ADMIN — Medication 5 MILLIGRAM(S): at 20:00

## 2017-07-20 RX ADMIN — INSULIN HUMAN 0.5 UNIT(S)/HR: 100 INJECTION, SOLUTION SUBCUTANEOUS at 22:30

## 2017-07-20 RX ADMIN — OXYCODONE HYDROCHLORIDE 10 MILLIGRAM(S): 5 TABLET ORAL at 22:52

## 2017-07-20 RX ADMIN — Medication 100 MILLIGRAM(S): at 06:36

## 2017-07-20 RX ADMIN — Medication 10 MILLIGRAM(S): at 17:28

## 2017-07-20 NOTE — PROGRESS NOTE ADULT - ASSESSMENT
83 yo with h/o pituitary mass s/p resection 20 years ago (path unknown) now readmitted for planned repeat resection for growing pituitary mass >2 cm with optic nerve compression.  Post operatively, would keep pt on steroids (if primary team doesn't need dex any more for any edema issues, could transition to 25 mg of hydrocortisone IV q6-8 hours depending on blood pressures).  Would also watch for DI by getting q8h serum sodiums and if high, would also get urine osm and urine spec gravity.  Will need TFT's checked before discharge as well.

## 2017-07-20 NOTE — PROGRESS NOTE ADULT - ASSESSMENT
Summary: 81yo woman POD#0 TSP for pituitary rxn, LD placement; intraop CSF leak repaired w/ alloderm, duragen, nasoseptal flap    NEURO:  q1h neuro checks, pain control, CTH in am, MRI, skull base precaution, am cortisol x3, DI watch, decadron taper per neurosx, LD at 5cc/hr till Sunday, clamp on Monday per neurosx    CARDS:  -150    PULM:  sat > 92%    RENAL:  IVL    GASTRO:  advance as tolerated  ---> Stress ulcer prophylaxis:  PPI not needed    HEME:  monitor H/H    ---> DVT prophylaxis: SCDs, hold anticoagulation, fresh post op    ENDO:  euglycemia    ID:  afebrile    Code status:  Full code  Disposition:  ICU Summary: 81yo woman POD#0 TSP for pituitary rxn, LD placement; intraop CSF leak repaired w/ alloderm, duragen, nasoseptal flap    NEURO:  q1h neuro checks, pain control, CTH in am, MRI, skull base precaution, am cortisol x3, DI watch, decadron taper per neurosx, LD at 5cc/hr till Sunday, clamp on Monday per neurosx    CARDS:  -150    PULM:  sat > 92%    RENAL:  IVL    GASTRO:  advance as tolerated  ---> Stress ulcer prophylaxis:  PPI not needed    HEME:  monitor H/H    ---> DVT prophylaxis: SCDs, hold anticoagulation, fresh post op    ENDO:  euglycemia    ID:  afebrile    MISC: none    SOCIAL/FAMILY: updated at bedside    CODE STATUS: Full Code     DISPOSITION: ICU    Patient is at high risk of neurologic deterioration/death due to: pituitary failure    Time spent: 45 critical care minutes    Alber Santacruz MD

## 2017-07-20 NOTE — PROGRESS NOTE ADULT - PROBLEM SELECTOR PLAN 1
s/p TSPR  - continue nasal packing  - continue humidified face tent  - nasal saline, 2 sprays to b/l nares 4 times a day.  - monitor for signs of Epistaxis and CSF leak  - avoid nasal trauma, heavy lifting and bending at waist.  - continue Ancef while nasal packing in place.   - Care a/p neurosurgery

## 2017-07-20 NOTE — PROGRESS NOTE ADULT - SUBJECTIVE AND OBJECTIVE BOX
Attempted to see patient but was not in room (had gone to surgery) Attempted to see patient but was not in room (had gone to surgery).  Please refer to yesterday's note for anticipatory guidance post operatively from a pituitary standpoint.

## 2017-07-20 NOTE — PROGRESS NOTE ADULT - SUBJECTIVE AND OBJECTIVE BOX
HPI:Mandi Brarfern Arabic speaking 82 year old female s/p tsp for pituitary tumor 20 years ago. History of TIA several months ago, takes ASA 81 daily.  Presents with worsening vision.  Baseline was blurry vision but claims she can now only see shadows in the past 2 weeks in both eyes. No acute HA. History provided by grandson and daughter.    C/o mild HA post-op    PAST MEDICAL HISTORY   Pituitary mass  Depression  Transient cerebral ischemia, unspecified type    PAST SURGICAL HISTORY   History of pituitary tumor  S/P cholecystectomy  No significant past surgical history    MEDICATIONS:  Anticoagulation: ASA 81 mg daily     PHYSICAL EXAMINATION:   T(C): 36.5 (07-18-17 @ 13:35), Max: 36.5 (07-18-17 @ 13:35)  HR: 58 (07-18-17 @ 13:35) (58 - 58)  BP: 123/75 (07-18-17 @ 13:35) (123/75 - 123/75)  RR: 18 (07-18-17 @ 13:35) (18 - 18)  SpO2: 97% (07-18-17 @ 13:35) (97% - 97%)  Wt(kg): --    LABS:                        13.2   6.2   )-----------( 143      ( 18 Jul 2017 14:32 )             37.9     07-18    144  |  108  |  15  ----------------------------<  100<H>  4.4   |  24  |  0.79    Ca    9.0      18 Jul 2017 14:32  Phos  3.3     07-18  Mg     2.1     07-18    TPro  6.6  /  Alb  4.0  /  TBili  1.4<H>  /  DBili  x   /  AST  16  /  ALT  12  /  AlkPhos  67  07-18    PT/INR - ( 18 Jul 2017 14:32 )   PT: 11.2 sec;   INR: 1.03 ratio         PTT - ( 18 Jul 2017 14:32 )  PTT:35.2 sec      RADIOLOGY & ADDITIONAL STUDIES:  MRI head 6/30/17: 2.6x2.3x1.7 pituitary mass, unchanged from 8/2016 read. (18 Jul 2017 16:23)    On admission, the patient was:  GCS:  Murphy-Sousa:  modified Melendez:  ICH score:  NIHSS:    VITALS:  T(C): , Max: 37.1 (07-19-17 @ 19:45)  HR:  (58 - 75)  BP:  (112/66 - 147/85)  ABP: --  RR:  (17 - 18)  SpO2:  (94% - 97%)  Wt(kg): --      07-19-17 @ 07:01  -  07-20-17 @ 07:00  --------------------------------------------------------  IN: 1500 mL / OUT: 0 mL / NET: 1500 mL      LABS:  Na: 142 (07-20 @ 09:40), 145 (07-19 @ 08:48), 144 (07-18 @ 14:32)  K: 5.0 (07-20 @ 09:40), 4.4 (07-19 @ 08:48), 4.4 (07-18 @ 14:32)  Cl: 105 (07-20 @ 09:40), 108 (07-19 @ 08:48), 108 (07-18 @ 14:32)  CO2: 24 (07-20 @ 09:40), 19 (07-19 @ 08:48), 24 (07-18 @ 14:32)  BUN: 20 (07-20 @ 09:40), 15 (07-18 @ 14:32)  Cr: 0.79 (07-20 @ 09:40), 0.79 (07-18 @ 14:32)  Glu: 181(07-20 @ 09:40), 100(07-18 @ 14:32)    Hgb: 14.1 (07-19 @ 08:31), 13.2 (07-18 @ 14:32)  Hct: 41.8 (07-19 @ 08:31), 37.9 (07-18 @ 14:32)  WBC: 4.53 (07-19 @ 08:31), 6.2 (07-18 @ 14:32)  Plt: 170 (07-19 @ 08:31), 143 (07-18 @ 14:32)  PT: 10.8 (07-20 @ 09:40)  INR: 0.99 (07-20 @ 09:40)  aPTT: 31.1 (07-20 @ 09:40)    IMAGING:   Recent imaging studies were reviewed.    MEDICATIONS:    EXAMINATION:  General:  calm  HEENT:  MMM  Neuro:  awake, alert, oriented x 3, follows commands, moves all extremities  Cards:  RRR  Respiratory:  no respiratory distress  Adomen:  soft  Extremities:  no edema  Skin:  warm/dry HPI:Mandi Brarfern Pashto speaking 82 year old female s/p tsp for pituitary tumor 20 years ago. History of TIA several months ago, takes ASA 81 daily.  Presents with worsening vision.  Baseline was blurry vision but claims she can now only see shadows in the past 2 weeks in both eyes. No acute HA. History provided by grandson and daughter.    C/o mild HA post-op    PAST MEDICAL HISTORY   Pituitary mass  Depression  Transient cerebral ischemia, unspecified type    PAST SURGICAL HISTORY   History of pituitary tumor  S/P cholecystectomy  No significant past surgical history    MEDICATIONS:  Anticoagulation: ASA 81 mg daily     PHYSICAL EXAMINATION:   T(C): 36.5 (07-18-17 @ 13:35), Max: 36.5 (07-18-17 @ 13:35)  HR: 58 (07-18-17 @ 13:35) (58 - 58)  BP: 123/75 (07-18-17 @ 13:35) (123/75 - 123/75)  RR: 18 (07-18-17 @ 13:35) (18 - 18)  SpO2: 97% (07-18-17 @ 13:35) (97% - 97%)  Wt(kg): --    LABS:                        13.2   6.2   )-----------( 143      ( 18 Jul 2017 14:32 )             37.9     07-18    144  |  108  |  15  ----------------------------<  100<H>  4.4   |  24  |  0.79    Ca    9.0      18 Jul 2017 14:32  Phos  3.3     07-18  Mg     2.1     07-18    TPro  6.6  /  Alb  4.0  /  TBili  1.4<H>  /  DBili  x   /  AST  16  /  ALT  12  /  AlkPhos  67  07-18    PT/INR - ( 18 Jul 2017 14:32 )   PT: 11.2 sec;   INR: 1.03 ratio         PTT - ( 18 Jul 2017 14:32 )  PTT:35.2 sec      RADIOLOGY & ADDITIONAL STUDIES:  MRI head 6/30/17: 2.6x2.3x1.7 pituitary mass, unchanged from 8/2016 read. (18 Jul 2017 16:23)    On admission, the patient was:  GCS:  Murphy-Sousa:  modified Melendez:  ICH score:  NIHSS:    VITALS:  T(C): , Max: 37.1 (07-19-17 @ 19:45)  HR:  (58 - 75)  BP:  (112/66 - 147/85)  ABP: --  RR:  (17 - 18)  SpO2:  (94% - 97%)  Wt(kg): --      07-19-17 @ 07:01  -  07-20-17 @ 07:00  --------------------------------------------------------  IN: 1500 mL / OUT: 0 mL / NET: 1500 mL      LABS:  Na: 142 (07-20 @ 09:40), 145 (07-19 @ 08:48), 144 (07-18 @ 14:32)  K: 5.0 (07-20 @ 09:40), 4.4 (07-19 @ 08:48), 4.4 (07-18 @ 14:32)  Cl: 105 (07-20 @ 09:40), 108 (07-19 @ 08:48), 108 (07-18 @ 14:32)  CO2: 24 (07-20 @ 09:40), 19 (07-19 @ 08:48), 24 (07-18 @ 14:32)  BUN: 20 (07-20 @ 09:40), 15 (07-18 @ 14:32)  Cr: 0.79 (07-20 @ 09:40), 0.79 (07-18 @ 14:32)  Glu: 181(07-20 @ 09:40), 100(07-18 @ 14:32)    Hgb: 14.1 (07-19 @ 08:31), 13.2 (07-18 @ 14:32)  Hct: 41.8 (07-19 @ 08:31), 37.9 (07-18 @ 14:32)  WBC: 4.53 (07-19 @ 08:31), 6.2 (07-18 @ 14:32)  Plt: 170 (07-19 @ 08:31), 143 (07-18 @ 14:32)  PT: 10.8 (07-20 @ 09:40)  INR: 0.99 (07-20 @ 09:40)  aPTT: 31.1 (07-20 @ 09:40)    IMAGING:   Recent imaging studies were reviewed.    MEDICATIONS:    EXAMINATION:  General:  calm  HEENT:  MMM, nasal packing  Neuro: vision loss unchanged, awake, alert, oriented x 3, follows commands, moves all extremities  Cards:  RRR  Respiratory:  no respiratory distress  Adomen:  soft  Extremities:  no edema  Skin:  warm/dry HPI:Mandi Brarfern Yakut speaking 82 year old female s/p tsp for pituitary tumor 20 years ago. History of TIA several months ago, takes ASA 81 daily.  Presents with worsening vision.  Baseline was blurry vision but claims she can now only see shadows in the past 2 weeks in both eyes. No acute HA. History provided by grandson and daughter.    C/o mild HA post-op    PAST MEDICAL HISTORY   Pituitary mass  Depression  Transient cerebral ischemia, unspecified type    PAST SURGICAL HISTORY   History of pituitary tumor  S/P cholecystectomy  No significant past surgical history    MEDICATIONS:  Anticoagulation: ASA 81 mg daily     PHYSICAL EXAMINATION:   T(C): 36.5 (07-18-17 @ 13:35), Max: 36.5 (07-18-17 @ 13:35)  HR: 58 (07-18-17 @ 13:35) (58 - 58)  BP: 123/75 (07-18-17 @ 13:35) (123/75 - 123/75)  RR: 18 (07-18-17 @ 13:35) (18 - 18)  SpO2: 97% (07-18-17 @ 13:35) (97% - 97%)  Wt(kg): --    LABS:                        13.2   6.2   )-----------( 143      ( 18 Jul 2017 14:32 )             37.9     07-18    144  |  108  |  15  ----------------------------<  100<H>  4.4   |  24  |  0.79    Ca    9.0      18 Jul 2017 14:32  Phos  3.3     07-18  Mg     2.1     07-18    TPro  6.6  /  Alb  4.0  /  TBili  1.4<H>  /  DBili  x   /  AST  16  /  ALT  12  /  AlkPhos  67  07-18    PT/INR - ( 18 Jul 2017 14:32 )   PT: 11.2 sec;   INR: 1.03 ratio         PTT - ( 18 Jul 2017 14:32 )  PTT:35.2 sec      RADIOLOGY & ADDITIONAL STUDIES:  MRI head 6/30/17: 2.6x2.3x1.7 pituitary mass, unchanged from 8/2016 read. (18 Jul 2017 16:23)    On admission, the patient was:  GCS:  Murphy-Sousa:  modified Melendez:  ICH score:  NIHSS:    VITALS:  T(C): , Max: 37.1 (07-19-17 @ 19:45)  HR:  (58 - 75)  BP:  (112/66 - 147/85)  ABP: --  RR:  (17 - 18)  SpO2:  (94% - 97%)  Wt(kg): --      07-19-17 @ 07:01  -  07-20-17 @ 07:00  --------------------------------------------------------  IN: 1500 mL / OUT: 0 mL / NET: 1500 mL      LABS:  Na: 142 (07-20 @ 09:40), 145 (07-19 @ 08:48), 144 (07-18 @ 14:32)  K: 5.0 (07-20 @ 09:40), 4.4 (07-19 @ 08:48), 4.4 (07-18 @ 14:32)  Cl: 105 (07-20 @ 09:40), 108 (07-19 @ 08:48), 108 (07-18 @ 14:32)  CO2: 24 (07-20 @ 09:40), 19 (07-19 @ 08:48), 24 (07-18 @ 14:32)  BUN: 20 (07-20 @ 09:40), 15 (07-18 @ 14:32)  Cr: 0.79 (07-20 @ 09:40), 0.79 (07-18 @ 14:32)  Glu: 181(07-20 @ 09:40), 100(07-18 @ 14:32)    Hgb: 14.1 (07-19 @ 08:31), 13.2 (07-18 @ 14:32)  Hct: 41.8 (07-19 @ 08:31), 37.9 (07-18 @ 14:32)  WBC: 4.53 (07-19 @ 08:31), 6.2 (07-18 @ 14:32)  Plt: 170 (07-19 @ 08:31), 143 (07-18 @ 14:32)  PT: 10.8 (07-20 @ 09:40)  INR: 0.99 (07-20 @ 09:40)  aPTT: 31.1 (07-20 @ 09:40)    IMAGING:   Recent imaging studies were reviewed.    MEDICATIONS:    EXAMINATION:  General:  calm  HEENT:  MMM, nasal packing  Neuro: able to count fingers, awake, alert, oriented x 3, follows commands, moves all extremities  Cards:  RRR  Respiratory:  no respiratory distress  Adomen:  soft  Extremities:  no edema  Skin:  warm/dry

## 2017-07-20 NOTE — PROVIDER CONTACT NOTE (MEDICATION) - RECOMMENDATIONS
Will continue to monitor and assess patient. As per pharmacy, patient recommended to receive oral pill dosage of Decadron

## 2017-07-20 NOTE — PROGRESS NOTE ADULT - SUBJECTIVE AND OBJECTIVE BOX
POST-OP NOTE:      Procedure: s/p TSPR    Subjective: Pt. seen and evaluated in NSCU.  No acute events post-op.  Pt. sts she is feeling better.  No pain.  No SOB.  she notes minimal bloody drainage from nose.  no n/v, no salty taste in mouth, no change in vision.     Vital Signs Last 24 Hrs  T(C): 36.7 (20 Jul 2017 19:00), Max: 37.1 (19 Jul 2017 23:39)  T(F): 98 (20 Jul 2017 19:00), Max: 98.8 (19 Jul 2017 23:39)  HR: 76 (20 Jul 2017 20:15) (58 - 76)  BP: 147/85 (20 Jul 2017 10:26) (112/66 - 147/85)  BP(mean): --  RR: 16 (20 Jul 2017 20:15) (9 - 25)  SpO2: 96% (20 Jul 2017 20:15) (90% - 97%)  I&O's Summary    19 Jul 2017 07:01  -  20 Jul 2017 07:00  --------------------------------------------------------  IN: 1500 mL / OUT: 0 mL / NET: 1500 mL    20 Jul 2017 07:01  -  20 Jul 2017 21:10  --------------------------------------------------------  IN: 300 mL / OUT: 185 mL / NET: 115 mL                            12.8   13.8  )-----------( 156      ( 20 Jul 2017 16:47 )             36.9     07-20    143  |  111<H>  |  16  ----------------------------<  232<H>  3.9   |  15<L>  |  0.71    Ca    7.8<L>      20 Jul 2017 16:47  Mg     1.8     07-20     PT/INR - ( 20 Jul 2017 09:40 )   PT: 10.8 sec;   INR: 0.99 ratio         PTT - ( 20 Jul 2017 09:40 )  PTT:31.1 sec    PHYSICAL EXAM:  Gen: NAD, well-developed  Head: Normocephalic, Atraumatic  Eyes: PERRL, EOMI, no scleral injection  Nose: b/l nares with merocel in place.  No evidence of bleeding.    Mouth: Mucosa moist, tongue/uvula midline, oropharynx with small amount of blood.  No evidence of blood running from NP  Neck: Flat, supple, no lymphadenopathy, trachea midline, no masses  Resp: breathing easily, no stridor  CV: no peripheral edema/cyanosis

## 2017-07-20 NOTE — BRIEF OPERATIVE NOTE - PROCEDURE
Transsphenoidal or transnasal resection of pituitary tumor with magnetic resonance imaging guidance  07/20/2017    Active  DBONDA

## 2017-07-20 NOTE — PROGRESS NOTE ADULT - SUBJECTIVE AND OBJECTIVE BOX
POD/STATUS POST/ENT ISSUE: Pituitary tumor, going for transphenoidal pituitary resection    INTERVAL HPI: Patient is an 82-year-old female with PMH of pituitary tumor, s/p transphenoidal resection 20 years ago who presents with a pituitary mass and progressively worsening vision. She will be undergoing a TSPR later today with Dr. Manrique and Dr. Maldonado. The patient is complaining of a headache today, but reports that it is alleviated with PO tylenol. Patient states that she is able to see what is directly in front of her, but she is still unable to see out of her peripheral fields. She notes occasional clear discharge from her nose. No dizziness, or feeling of spinning. Denies N/V, no SOB, or chest pain.    Pt is Mosotho speaking. Assessment performed in Mosotho at bedside.    Vital Signs Last 24 Hrs  T(C): 36.6 (20 Jul 2017 10:26), Max: 37.1 (19 Jul 2017 15:48)  T(F): 97.9 (20 Jul 2017 10:26), Max: 98.8 (19 Jul 2017 15:48)  HR: 58 (20 Jul 2017 10:26) (58 - 75)  BP: 147/85 (20 Jul 2017 10:26) (112/66 - 147/85)  BP(mean): --  RR: 18 (20 Jul 2017 10:26) (17 - 18)  SpO2: 96% (20 Jul 2017 10:26) (92% - 97%)    PHYSICAL EXAM:  Gen: NAD, well-developed  Head: Normocephalic, Atraumatic  Face: no edema/erythema/fluctuance, parotid glands soft without mass  Eyes: PERRL, EOMI, no scleral injection  Nose: Nares bilaterally patent, no discharge  Mouth: No Stridor / Drooling / Trismus.  Mucosa moist, tongue/uvula midline, oropharynx clear  Neck: Flat, supple, no lymphadenopathy, trachea midline, no masses  Resp: breathing easily, no stridor  CV: no peripheral edema/cyanosis    LABS:                        14.1   4.53  )-----------( 170      ( 19 Jul 2017 08:31 )             41.8

## 2017-07-20 NOTE — BRIEF OPERATIVE NOTE - OPERATION/FINDINGS
endoscopic transphenoidal resection of sellar/suprasellar mass w/ placement of lumbar drain; intraop CSF leak, repaired w/ duragen, alloderm, and nasoseptal flap

## 2017-07-21 DIAGNOSIS — E03.8 OTHER SPECIFIED HYPOTHYROIDISM: ICD-10-CM

## 2017-07-21 DIAGNOSIS — E23.2 DIABETES INSIPIDUS: ICD-10-CM

## 2017-07-21 DIAGNOSIS — R73.9 HYPERGLYCEMIA, UNSPECIFIED: ICD-10-CM

## 2017-07-21 LAB
ANION GAP SERPL CALC-SCNC: 13 MMOL/L — SIGNIFICANT CHANGE UP (ref 5–17)
BUN SERPL-MCNC: 8 MG/DL — SIGNIFICANT CHANGE UP (ref 7–23)
BUN SERPL-MCNC: 8 MG/DL — SIGNIFICANT CHANGE UP (ref 7–23)
BUN SERPL-MCNC: 9 MG/DL — SIGNIFICANT CHANGE UP (ref 7–23)
CALCIUM SERPL-MCNC: 8.4 MG/DL — SIGNIFICANT CHANGE UP (ref 8.4–10.5)
CALCIUM SERPL-MCNC: 8.4 MG/DL — SIGNIFICANT CHANGE UP (ref 8.4–10.5)
CALCIUM SERPL-MCNC: 8.6 MG/DL — SIGNIFICANT CHANGE UP (ref 8.4–10.5)
CHLORIDE SERPL-SCNC: 112 MMOL/L — HIGH (ref 96–108)
CHLORIDE SERPL-SCNC: 113 MMOL/L — HIGH (ref 96–108)
CHLORIDE SERPL-SCNC: 117 MMOL/L — HIGH (ref 96–108)
CO2 SERPL-SCNC: 18 MMOL/L — LOW (ref 22–31)
CO2 SERPL-SCNC: 18 MMOL/L — LOW (ref 22–31)
CO2 SERPL-SCNC: 19 MMOL/L — LOW (ref 22–31)
CORTIS AM PEAK SERPL-MCNC: 18.9 UG/DL — HIGH (ref 6–18.4)
CREAT SERPL-MCNC: 0.65 MG/DL — SIGNIFICANT CHANGE UP (ref 0.5–1.3)
CREAT SERPL-MCNC: 0.67 MG/DL — SIGNIFICANT CHANGE UP (ref 0.5–1.3)
CREAT SERPL-MCNC: 0.69 MG/DL — SIGNIFICANT CHANGE UP (ref 0.5–1.3)
GLUCOSE SERPL-MCNC: 139 MG/DL — HIGH (ref 70–99)
GLUCOSE SERPL-MCNC: 147 MG/DL — HIGH (ref 70–99)
GLUCOSE SERPL-MCNC: 179 MG/DL — HIGH (ref 70–99)
HBA1C BLD-MCNC: 5.6 % — SIGNIFICANT CHANGE UP (ref 4–5.6)
HCT VFR BLD CALC: 35.3 % — SIGNIFICANT CHANGE UP (ref 34.5–45)
HGB BLD-MCNC: 12.3 G/DL — SIGNIFICANT CHANGE UP (ref 11.5–15.5)
MAGNESIUM SERPL-MCNC: 2.2 MG/DL — SIGNIFICANT CHANGE UP (ref 1.6–2.6)
MCHC RBC-ENTMCNC: 34.8 GM/DL — SIGNIFICANT CHANGE UP (ref 32–36)
MCHC RBC-ENTMCNC: 36.4 PG — HIGH (ref 27–34)
MCV RBC AUTO: 104 FL — HIGH (ref 80–100)
PHOSPHATE SERPL-MCNC: 1.9 MG/DL — LOW (ref 2.5–4.5)
PLATELET # BLD AUTO: 132 K/UL — LOW (ref 150–400)
POTASSIUM SERPL-MCNC: 3.9 MMOL/L — SIGNIFICANT CHANGE UP (ref 3.5–5.3)
POTASSIUM SERPL-MCNC: 3.9 MMOL/L — SIGNIFICANT CHANGE UP (ref 3.5–5.3)
POTASSIUM SERPL-MCNC: 4.1 MMOL/L — SIGNIFICANT CHANGE UP (ref 3.5–5.3)
POTASSIUM SERPL-SCNC: 3.9 MMOL/L — SIGNIFICANT CHANGE UP (ref 3.5–5.3)
POTASSIUM SERPL-SCNC: 3.9 MMOL/L — SIGNIFICANT CHANGE UP (ref 3.5–5.3)
POTASSIUM SERPL-SCNC: 4.1 MMOL/L — SIGNIFICANT CHANGE UP (ref 3.5–5.3)
RBC # BLD: 3.38 M/UL — LOW (ref 3.8–5.2)
RBC # FLD: 12 % — SIGNIFICANT CHANGE UP (ref 10.3–14.5)
SODIUM SERPL-SCNC: 143 MMOL/L — SIGNIFICANT CHANGE UP (ref 135–145)
SODIUM SERPL-SCNC: 145 MMOL/L — SIGNIFICANT CHANGE UP (ref 135–145)
SODIUM SERPL-SCNC: 148 MMOL/L — HIGH (ref 135–145)
SP GR UR STRIP: 1 — LOW (ref 1.01–1.02)
WBC # BLD: 13.2 K/UL — HIGH (ref 3.8–10.5)
WBC # FLD AUTO: 13.2 K/UL — HIGH (ref 3.8–10.5)

## 2017-07-21 PROCEDURE — 70450 CT HEAD/BRAIN W/O DYE: CPT | Mod: 26

## 2017-07-21 PROCEDURE — 70553 MRI BRAIN STEM W/O & W/DYE: CPT | Mod: 26

## 2017-07-21 PROCEDURE — 99024 POSTOP FOLLOW-UP VISIT: CPT

## 2017-07-21 PROCEDURE — 99291 CRITICAL CARE FIRST HOUR: CPT

## 2017-07-21 PROCEDURE — 99233 SBSQ HOSP IP/OBS HIGH 50: CPT

## 2017-07-21 RX ORDER — INSULIN LISPRO 100/ML
VIAL (ML) SUBCUTANEOUS
Qty: 0 | Refills: 0 | Status: DISCONTINUED | OUTPATIENT
Start: 2017-07-21 | End: 2017-07-26

## 2017-07-21 RX ORDER — OXYCODONE HYDROCHLORIDE 5 MG/1
5 TABLET ORAL ONCE
Qty: 0 | Refills: 0 | Status: DISCONTINUED | OUTPATIENT
Start: 2017-07-21 | End: 2017-07-21

## 2017-07-21 RX ORDER — GLUCAGON INJECTION, SOLUTION 0.5 MG/.1ML
1 INJECTION, SOLUTION SUBCUTANEOUS ONCE
Qty: 0 | Refills: 0 | Status: DISCONTINUED | OUTPATIENT
Start: 2017-07-21 | End: 2017-07-26

## 2017-07-21 RX ORDER — DEXTROSE 50 % IN WATER 50 %
25 SYRINGE (ML) INTRAVENOUS ONCE
Qty: 0 | Refills: 0 | Status: DISCONTINUED | OUTPATIENT
Start: 2017-07-21 | End: 2017-07-26

## 2017-07-21 RX ORDER — DEXTROSE 50 % IN WATER 50 %
1 SYRINGE (ML) INTRAVENOUS ONCE
Qty: 0 | Refills: 0 | Status: DISCONTINUED | OUTPATIENT
Start: 2017-07-21 | End: 2017-07-26

## 2017-07-21 RX ORDER — DEXTROSE 50 % IN WATER 50 %
12.5 SYRINGE (ML) INTRAVENOUS ONCE
Qty: 0 | Refills: 0 | Status: DISCONTINUED | OUTPATIENT
Start: 2017-07-21 | End: 2017-07-26

## 2017-07-21 RX ORDER — SODIUM CHLORIDE 9 MG/ML
1000 INJECTION, SOLUTION INTRAVENOUS
Qty: 0 | Refills: 0 | Status: DISCONTINUED | OUTPATIENT
Start: 2017-07-21 | End: 2017-07-26

## 2017-07-21 RX ORDER — HYDROCORTISONE 20 MG
25 TABLET ORAL EVERY 6 HOURS
Qty: 0 | Refills: 0 | Status: DISCONTINUED | OUTPATIENT
Start: 2017-07-21 | End: 2017-07-22

## 2017-07-21 RX ORDER — POTASSIUM PHOSPHATE, MONOBASIC POTASSIUM PHOSPHATE, DIBASIC 236; 224 MG/ML; MG/ML
15 INJECTION, SOLUTION INTRAVENOUS ONCE
Qty: 0 | Refills: 0 | Status: COMPLETED | OUTPATIENT
Start: 2017-07-21 | End: 2017-07-22

## 2017-07-21 RX ORDER — POTASSIUM CHLORIDE 20 MEQ
20 PACKET (EA) ORAL ONCE
Qty: 0 | Refills: 0 | Status: COMPLETED | OUTPATIENT
Start: 2017-07-21 | End: 2017-07-21

## 2017-07-21 RX ORDER — DEXAMETHASONE 0.5 MG/5ML
4 ELIXIR ORAL EVERY 6 HOURS
Qty: 0 | Refills: 0 | Status: DISCONTINUED | OUTPATIENT
Start: 2017-07-21 | End: 2017-07-21

## 2017-07-21 RX ORDER — MAGNESIUM SULFATE 500 MG/ML
1 VIAL (ML) INJECTION ONCE
Qty: 0 | Refills: 0 | Status: COMPLETED | OUTPATIENT
Start: 2017-07-21 | End: 2017-07-21

## 2017-07-21 RX ORDER — ENOXAPARIN SODIUM 100 MG/ML
40 INJECTION SUBCUTANEOUS
Qty: 0 | Refills: 0 | Status: DISCONTINUED | OUTPATIENT
Start: 2017-07-21 | End: 2017-07-26

## 2017-07-21 RX ADMIN — OXYCODONE HYDROCHLORIDE 5 MILLIGRAM(S): 5 TABLET ORAL at 10:21

## 2017-07-21 RX ADMIN — Medication 10 MILLIGRAM(S): at 00:21

## 2017-07-21 RX ADMIN — Medication 4 MILLIGRAM(S): at 12:37

## 2017-07-21 RX ADMIN — POLYETHYLENE GLYCOL 3350 17 GRAM(S): 17 POWDER, FOR SOLUTION ORAL at 14:39

## 2017-07-21 RX ADMIN — Medication 100 MILLIGRAM(S): at 14:39

## 2017-07-21 RX ADMIN — Medication 100 GRAM(S): at 05:36

## 2017-07-21 RX ADMIN — OXYCODONE HYDROCHLORIDE 5 MILLIGRAM(S): 5 TABLET ORAL at 14:20

## 2017-07-21 RX ADMIN — Medication 25 MILLIGRAM(S): at 18:43

## 2017-07-21 RX ADMIN — Medication 100 MILLIGRAM(S): at 22:24

## 2017-07-21 RX ADMIN — ESCITALOPRAM OXALATE 10 MILLIGRAM(S): 10 TABLET, FILM COATED ORAL at 12:38

## 2017-07-21 RX ADMIN — OXYCODONE HYDROCHLORIDE 5 MILLIGRAM(S): 5 TABLET ORAL at 15:00

## 2017-07-21 RX ADMIN — Medication 25 MILLIGRAM(S): at 23:04

## 2017-07-21 RX ADMIN — Medication 20 MILLIEQUIVALENT(S): at 05:38

## 2017-07-21 RX ADMIN — ENOXAPARIN SODIUM 40 MILLIGRAM(S): 100 INJECTION SUBCUTANEOUS at 18:44

## 2017-07-21 RX ADMIN — Medication 100 MILLIGRAM(S): at 05:37

## 2017-07-21 RX ADMIN — Medication 100 MILLIGRAM(S): at 05:35

## 2017-07-21 RX ADMIN — PANTOPRAZOLE SODIUM 40 MILLIGRAM(S): 20 TABLET, DELAYED RELEASE ORAL at 12:37

## 2017-07-21 RX ADMIN — Medication 10 MILLIGRAM(S): at 05:37

## 2017-07-21 RX ADMIN — OXYCODONE HYDROCHLORIDE 5 MILLIGRAM(S): 5 TABLET ORAL at 11:00

## 2017-07-21 NOTE — PROGRESS NOTE ADULT - PROBLEM SELECTOR PLAN 1
s/p TSS yesterday.  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  Vision improving post op per her report.

## 2017-07-21 NOTE — PROGRESS NOTE ADULT - PROBLEM SELECTOR PLAN 2
AM cortisol (on dex) was 18 which is sufficient.  Will taper off her steroids as she was getting hyperglycemic on high dose dex - now on 25 mg IV q6h hydrocortisone and will continue to taper.

## 2017-07-21 NOTE — PROGRESS NOTE ADULT - PROBLEM SELECTOR PLAN 1
1- Pain control  2- Diet per NSx  3- Continue Nasal packings  4- DVT prophylaxis  5- Monitor for CSF leak

## 2017-07-21 NOTE — PROGRESS NOTE ADULT - SUBJECTIVE AND OBJECTIVE BOX
Chief Complaint: s/p pituitary TSS resection    Interval history: Pt had TSS yesterday, still with nasal packing.  Her serum Na went up to 148 max then declined recently to 143 without any intervention. She is NPO but is drinking about 600 cc water so far today, about 2 L out in jenkins (darker yellow colored urine).  She denies feeling of excessive thirst.  She does endorese HA.  She was on 10 mg q6h decadron post op but was switched earlier today to 25 mg IV hydrocort q6h to wean.      MEDICATIONS  (STANDING):  polyethylene glycol 3350 17 Gram(s) Oral daily  docusate sodium 100 milliGRAM(s) Oral three times a day  escitalopram 10 milliGRAM(s) Oral daily  pantoprazole  Injectable 40 milliGRAM(s) IV Push daily  sodium chloride 0.9% with potassium chloride 20 mEq/L 1000 milliLiter(s) (75 mL/Hr) IV Continuous <Continuous>  enoxaparin Injectable 40 milliGRAM(s) SubCutaneous <User Schedule>  hydrocortisone sodium succinate Injectable 25 milliGRAM(s) IV Push every 6 hours  insulin lispro (HumaLOG) corrective regimen sliding scale   SubCutaneous three times a day before meals  dextrose 5%. 1000 milliLiter(s) (50 mL/Hr) IV Continuous <Continuous>  dextrose 50% Injectable 12.5 Gram(s) IV Push once  dextrose 50% Injectable 25 Gram(s) IV Push once  dextrose 50% Injectable 25 Gram(s) IV Push once    MEDICATIONS  (PRN):  senna 2 Tablet(s) Oral at bedtime PRN Constipation  acetaminophen   Tablet 650 milliGRAM(s) Oral every 6 hours PRN For Temp greater than 38 C (100.4 F)  acetaminophen   Tablet. 650 milliGRAM(s) Oral every 6 hours PRN Mild Pain (1 - 3)  ondansetron Injectable 4 milliGRAM(s) IV Push every 6 hours PRN Nausea and/or Vomiting  oxyCODONE    IR 5 milliGRAM(s) Oral every 8 hours PRN Moderate Pain (4 - 6)  oxyCODONE    IR 10 milliGRAM(s) Oral every 8 hours PRN Severe Pain (7 - 10)  dextrose Gel 1 Dose(s) Oral once PRN Blood Glucose LESS THAN 70 milliGRAM(s)/deciliter  glucagon  Injectable 1 milliGRAM(s) IntraMuscular once PRN Glucose LESS THAN 70 milligrams/deciliter      Allergies    No Known Allergies    Intolerances      Review of Systems:  Constitutional: No fever  Eyes: No blurry vision  Neuro: + HA  Psych: no depression  Endocrine: no excessive polyuria, polydipsia  Hem/lymph: no swelling  Osteoporosis: no fractures    ALL OTHER SYSTEMS REVIEWED AND NEGATIVE      PHYSICAL EXAM:  VITALS: T(C): 37 (07-21-17 @ 15:00)  T(F): 98.6 (07-21-17 @ 15:00), Max: 98.6 (07-21-17 @ 15:00)  HR: 60 (07-21-17 @ 15:00) (54 - 76)  BP: 131/62 (07-21-17 @ 15:00) (102/63 - 131/62)  RR:  (9 - 25)  SpO2:  (90% - 97%)  Wt(kg): --  GENERAL: NAD, well-groomed, well-developed  EYES: No proptosis, no lid lag, anicteric  HEENT:  Atraumatic, hasal packign present   SKIN: Dry, intact, No rashes or lesions  PSYCH: Alert and oriented x 3, normal affect, normal mood    CAPILLARY BLOOD GLUCOSE  121 (07-21 @ 12:00)  117 (07-21 @ 11:00)  124 (07-21 @ 10:00)  130 (07-21 @ 09:00)  129 (07-21 @ 08:00)  128 (07-21 @ 07:00)  125 (07-21 @ 06:00)  134 (07-21 @ 05:00)  127 (07-21 @ 04:00)  190 (07-21 @ 03:00)  193 (07-21 @ 02:00)  195 (07-21 @ 01:00)  238 (07-21 @ 00:00)  295 (07-20 @ 23:00)  269 (07-20 @ 22:00)  250 (07-20 @ 21:00)  158 (07-20 @ 08:22)  151 (07-20 @ 05:51)  185 (07-20 @ 00:21)  228 (07-19 @ 21:44)      07-21    143  |  112<H>  |  8   ----------------------------<  139<H>  4.1   |  18<L>  |  0.67    EGFR if : 95  EGFR if non : 82    Ca    8.6      07-21  Mg     1.8     07-20  Phos  2.6     07-20            Thyroid Function Tests:  07-18 @ 20:08 TSH 1.92 FreeT4 -- T3 -- Anti TPO -- Anti Thyroglobulin Ab -- TSI --  07-01 @ 07:00 TSH -- FreeT4 1.06 T3 -- Anti TPO -- Anti Thyroglobulin Ab -- TSI --

## 2017-07-21 NOTE — PROGRESS NOTE ADULT - SUBJECTIVE AND OBJECTIVE BOX
83 yo F s/p TSP for pituitary tumor and LD on 07/20/2017, complicated by intra-op CSF leak. History of TIA several months ago, takes ASA 81 daily.  Presents with worsening vision.  Baseline was blurry vision but claims she can now only see shadows in the past 2 weeks in both eyes. No acute HA. History provided by grandson and daughter.    Overnight Events: noted to have urine output 200 ml/h x1 overnight.    ROS: negative  VITALS:   BP, HR WNL.  Afebrile.    I&O's Detail    20 Jul 2017 07:01  -  21 Jul 2017 07:00  --------------------------------------------------------  IN:    insulin Infusion: 36 mL    IV PiggyBack: 50 mL    Oral Fluid: 360 mL    sodium chloride 0.9% with potassium chloride 20 mEq/L: 1125 mL  Total IN: 1571 mL    OUT:    Drain: 75 mL    Indwelling Catheter - Urethral: 1525 mL  Total OUT: 1600 mL    Total NET: -29 m    LA:                        12.7   16.9  )-----------( 156      ( 20 Jul 2017 21:32 )             37.1     07-21    148<H>  |  117<H>  |  9   ----------------------------<  147<H>  3.9   |  18<L>  |  0.65    Ca    8.4      21 Jul 2017 05:33  Phos  2.6     07-20  Mg     1.8     07-20      PT/INR - ( 20 Jul 2017 09:40 )   PT: 10.8 sec;   INR: 0.99 ratio        PTT - ( 20 Jul 2017 09:40 )  PTT:31.1 sec    MEDS:  polyethylene glycol 3350 17 Gram(s) Oral daily  docusate sodium 100 milliGRAM(s) Oral three times a day  escitalopram 10 milliGRAM(s) Oral daily  pantoprazole  Injectable 40 milliGRAM(s) IV Push daily  sodium chloride 0.9% with potassium chloride 20 mEq/L 1000 milliLiter(s) IV Continuous <Continuous>  insulin Infusion 0.5 Unit(s)/Hr IV Continuous <Continuous>  dexamethasone  Injectable 4 milliGRAM(s) IV Push every 6 hours      [All pertinent recent Imaging/Reports reviewed]    DEVICES: [] Restraints [] GEO/HMV []LD [] ET tube [] Trach [] Chest Tube [] A-line [x] Cerda [] NGT [] Rectal Tube 83 yo F s/p TSP for pituitary tumor and LD on 07/20/2017, complicated by intra-op CSF leak. History of TIA several months ago, takes ASA 81 daily.  Presents with worsening vision.  Baseline was blurry vision but claims she can now only see shadows in the past 2 weeks in both eyes. No acute HA. History provided by grandson and daughter.    Overnight Events: noted to have urine output 200 ml/h x 8 hiours    ROS: negative [] unable to obtain as patient is comatose/intubated/aphasic []     VITALS:   T(C): 36.8 (07-21-17 @ 07:00), Max: 37 (07-20-17 @ 16:20)  HR: 57 (07-21-17 @ 11:00) (54 - 76)  BP: 119/60 (07-21-17 @ 11:00) (102/63 - 130/66)  RR: 11 (07-21-17 @ 11:00) (9 - 25)  SpO2: 93% (07-21-17 @ 11:00) (90% - 97%)    07-20-17 @ 07:01  -  07-21-17 @ 07:00  --------------------------------------------------------  IN: 1721 mL / OUT: 1975 mL / NET: -254 mL    07-21-17 @ 07:01  -  07-21-17 @ 13:53  --------------------------------------------------------  IN: 707.5 mL / OUT: 880 mL / NET: -172.5 mL    LABS:                        12.7   16.9  )-----------( 156      ( 20 Jul 2017 21:32 )             37.1     07-21    148<H>  |  117<H>  |  9   ----------------------------<  147<H>  3.9   |  18<L>  |  0.65    Ca    8.4      21 Jul 2017 05:33  Phos  2.6     07-20  Mg     1.8     07-20    PT/INR - ( 20 Jul 2017 09:40 )   PT: 10.8 sec;   INR: 0.99 ratio     PTT - ( 20 Jul 2017 09:40 )  PTT:31.1 sec  MEDS:  MEDICATIONS  (STANDING):  polyethylene glycol 3350 17 Gram(s) Oral daily  docusate sodium 100 milliGRAM(s) Oral three times a day  escitalopram 10 milliGRAM(s) Oral daily  pantoprazole  Injectable 40 milliGRAM(s) IV Push daily  sodium chloride 0.9% with potassium chloride 20 mEq/L 1000 milliLiter(s) (75 mL/Hr) IV Continuous <Continuous>  insulin Infusion 0.5 Unit(s)/Hr (0.5 mL/Hr) IV Continuous <Continuous>  dexamethasone  Injectable 4 milliGRAM(s) IV Push every 6 hours      [All pertinent recent Imaging/Reports reviewed]    DEVICES: [] Restraints [] GEO/HMV []LD [] ET tube [] Trach [] A-line [] Cerda [] NGT [] Rectal Tube

## 2017-07-21 NOTE — PROGRESS NOTE ADULT - ASSESSMENT
Summary: 83yo woman POD#1 TSP for pituitary rxn, LD placement; intraop CSF leak repaired w/ alloderm, duragen, nasoseptal flap.    NEURO:  q1h neuro checks, pain control, CTH in am, MRI, skull base precaution, am cortisol x3, DI watch, decadron taper per neurosx, LD at 5cc/hr till Sunday, clamp on Monday per neurosx    CARDS:  -150    PULM:  sat > 92%    RENAL:  IVL    GASTRO:  advance as tolerated  ---> Stress ulcer prophylaxis:  PPI not needed    HEME:  monitor H/H    ---> DVT prophylaxis: SCDs, hold anticoagulation, fresh post op    ENDO:  euglycemia    ID:  afebrile    MISC: none    SOCIAL/FAMILY: updated at bedside    CODE STATUS: Full Code     DISPOSITION: ICU    Patient is at high risk of neurologic deterioration/death due to: pituitary failure    Time spent: 45 critical care minutes

## 2017-07-21 NOTE — PROGRESS NOTE ADULT - ASSESSMENT
Summary: 81yo woman POD#1 TSP for pituitary rxn, LD placement; intraop CSF leak repaired w/ alloderm, duragen, nasoseptal flap.    NEURO:  q1h neuro checks, pain control, CTH in am, MRI, skull base precaution, am cortisol x3, DI watch - urine output high, decadron taper per neurosx, LD at 5cc/hr till Sunday, clamp on Monday per neurosx    CARDS:  -150    PULM:  sat > 92%    RENAL:  IVL    GASTRO:  advance as tolerated  ---> Stress ulcer prophylaxis:  PPI not needed    HEME:  monitor H/H    ---> DVT prophylaxis: SCDs, hold anticoagulation, fresh post op    ENDO:  euglycemia    ID:  afebrile    MISC: none    SOCIAL/FAMILY: updated at bedside    CODE STATUS: Full Code     DISPOSITION: ICU    Patient is at high risk of neurologic deterioration/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    NEURO:  q1h neuro checks, pain control, CTH in am, MRI, skull base precaution, am cortisol x3, DI watch - urine output high see endocrine, decadron taper per neurosx, LD at 5cc/hr till Sunday, clamp on Monday per neurosx    CARDS:  -150    PULM:  sat > 92%    RENAL:  IVL    GASTRO:  advance as tolerated  ---> Stress ulcer prophylaxis:  PPI not needed  - start feeds once, insulin drip is off.     HEME:  monitor H/H    ---> DVT prophylaxis: SCDs, start Lovenox     ENDO:  NPO - on insulin drip. f/up Na q8h, Urine spec grav. f/up endocrine labs, will likely need ddavp. 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

## 2017-07-21 NOTE — PROGRESS NOTE ADULT - ASSESSMENT
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 She underwent repeat TSS on 7/20.  Post op, her cortisol was 18 (on dex) and her TFT's have not yet been checked.  She was briefly on insulin gtt when was on high dose 10 mg q6h decadron but sugars improved after gtt stopped and steroids switched to 25 mg q6h IV hydrocort today.  Watching for DI as serum Na tomasz to 148 but declined on its own today without profound polyuria but did have lower spec grav.     Yi interpretor used for entire discussion.

## 2017-07-21 NOTE — PROGRESS NOTE ADULT - SUBJECTIVE AND OBJECTIVE BOX
POD/STATUS POST/ENT ISSUE: S/P TSRP POD #1    INTERVAL HPI: Mandi Vasques Maltese speaking 82 year old female s/p tsp for pituitary tumor 20 years ago. History of TIA several months ago, takes ASA 81 daily.  Presents with worsening vision.  Baseline was blurry vision but claims she can now only see shadows in the past 2 weeks in both eyes. No acute HA. S/P TSRP POD #1, with nasoseptal flap to repair intra OP CSF leak. Pt admits to HAs relieved by pain meds, B/L nasal congestion, and decreased Hearing B/L. denies epistaxis, clear nasal discharge, fevers.    Vital Signs Last 24 Hrs  T(C): 36.8 (21 Jul 2017 07:00), Max: 37 (20 Jul 2017 16:20)  T(F): 98.2 (21 Jul 2017 07:00), Max: 98.6 (20 Jul 2017 16:20)  HR: 59 (21 Jul 2017 08:00) (58 - 76)  BP: 117/58 (21 Jul 2017 08:00) (104/49 - 147/85)  BP(mean): 77 (21 Jul 2017 08:00) (65 - 85)  RR: 13 (21 Jul 2017 08:00) (9 - 25)  SpO2: 96% (21 Jul 2017 08:00) (90% - 97%)    PHYSICAL EXAM:  Gen: NAD, well-developed  Head: Normocephalic, Atraumatic  Eyes: PERRL, EOMI, no scleral injection  Nose: B/L nasal packings in place, with dry blood around them, no active bleeding, no CSF leak  Mouth: Mucosa moist, tongue/uvula midline, dry blood in oropharynx, no active bleeding     Neck: Flat, supple, no lymphadenopathy, trachea midline, no masses  Resp:  no stridor  CV: no peripheral edema/cyanosis    LABS:                        12.7   16.9  )-----------( 156      ( 20 Jul 2017 21:32 )             37.1

## 2017-07-21 NOTE — PROGRESS NOTE ADULT - SUBJECTIVE AND OBJECTIVE BOX
83 yo F s/p TSP for pituitary tumor and LD on 07/20/2017, complicated by intra-op CSF leak. History of TIA several months ago, takes ASA 81 daily.  Presents with worsening vision.  Baseline was blurry vision but claims she can now only see shadows in the past 2 weeks in both eyes. No acute HA. History provided by grandson and daughter.    Overnight Events: noted to have urine output 200 ml/h x2 overnight.    ROS: negative  VITALS:   BP, HR WNL.  Afebrile.    I&O's Detail    20 Jul 2017 07:01  -  21 Jul 2017 07:00  --------------------------------------------------------  IN:    insulin Infusion: 36 mL    IV PiggyBack: 50 mL    Oral Fluid: 360 mL    sodium chloride 0.9% with potassium chloride 20 mEq/L: 1125 mL  Total IN: 1571 mL    OUT:    Drain: 75 mL    Indwelling Catheter - Urethral: 1525 mL  Total OUT: 1600 mL    Total NET: -29 mL          LABS:                          12.7   16.9  )-----------( 156      ( 20 Jul 2017 21:32 )             37.1     07-21    148<H>  |  117<H>  |  9   ----------------------------<  147<H>  3.9   |  18<L>  |  0.65    Ca    8.4      21 Jul 2017 05:33  Phos  2.6     07-20  Mg     1.8     07-20      PT/INR - ( 20 Jul 2017 09:40 )   PT: 10.8 sec;   INR: 0.99 ratio         PTT - ( 20 Jul 2017 09:40 )  PTT:31.1 sec    MEDS:  polyethylene glycol 3350 17 Gram(s) Oral daily  docusate sodium 100 milliGRAM(s) Oral three times a day  escitalopram 10 milliGRAM(s) Oral daily  pantoprazole  Injectable 40 milliGRAM(s) IV Push daily  sodium chloride 0.9% with potassium chloride 20 mEq/L 1000 milliLiter(s) IV Continuous <Continuous>  insulin Infusion 0.5 Unit(s)/Hr IV Continuous <Continuous>  dexamethasone  Injectable 4 milliGRAM(s) IV Push every 6 hours      [All pertinent recent Imaging/Reports reviewed]    DEVICES: [] Restraints [] GEO/HMV []LD [] ET tube [] Trach [] Chest Tube [] A-line [x] Cerda [] NGT [] Rectal Tube

## 2017-07-22 ENCOUNTER — TRANSCRIPTION ENCOUNTER (OUTPATIENT)
Age: 82
End: 2017-07-22

## 2017-07-22 LAB
ANION GAP SERPL CALC-SCNC: 10 MMOL/L — SIGNIFICANT CHANGE UP (ref 5–17)
ANION GAP SERPL CALC-SCNC: 9 MMOL/L — SIGNIFICANT CHANGE UP (ref 5–17)
BUN SERPL-MCNC: 8 MG/DL — SIGNIFICANT CHANGE UP (ref 7–23)
BUN SERPL-MCNC: 9 MG/DL — SIGNIFICANT CHANGE UP (ref 7–23)
CALCIUM SERPL-MCNC: 7.8 MG/DL — LOW (ref 8.4–10.5)
CALCIUM SERPL-MCNC: 8.1 MG/DL — LOW (ref 8.4–10.5)
CHLORIDE SERPL-SCNC: 110 MMOL/L — HIGH (ref 96–108)
CHLORIDE SERPL-SCNC: 116 MMOL/L — HIGH (ref 96–108)
CO2 SERPL-SCNC: 21 MMOL/L — LOW (ref 22–31)
CO2 SERPL-SCNC: 22 MMOL/L — SIGNIFICANT CHANGE UP (ref 22–31)
CREAT SERPL-MCNC: 0.61 MG/DL — SIGNIFICANT CHANGE UP (ref 0.5–1.3)
CREAT SERPL-MCNC: 0.63 MG/DL — SIGNIFICANT CHANGE UP (ref 0.5–1.3)
GLUCOSE SERPL-MCNC: 137 MG/DL — HIGH (ref 70–99)
GLUCOSE SERPL-MCNC: 196 MG/DL — HIGH (ref 70–99)
POTASSIUM SERPL-MCNC: 4.1 MMOL/L — SIGNIFICANT CHANGE UP (ref 3.5–5.3)
POTASSIUM SERPL-MCNC: 5.4 MMOL/L — HIGH (ref 3.5–5.3)
POTASSIUM SERPL-SCNC: 4.1 MMOL/L — SIGNIFICANT CHANGE UP (ref 3.5–5.3)
POTASSIUM SERPL-SCNC: 5.4 MMOL/L — HIGH (ref 3.5–5.3)
SODIUM SERPL-SCNC: 141 MMOL/L — SIGNIFICANT CHANGE UP (ref 135–145)
SODIUM SERPL-SCNC: 147 MMOL/L — HIGH (ref 135–145)

## 2017-07-22 PROCEDURE — 99024 POSTOP FOLLOW-UP VISIT: CPT

## 2017-07-22 PROCEDURE — 99291 CRITICAL CARE FIRST HOUR: CPT

## 2017-07-22 PROCEDURE — 70450 CT HEAD/BRAIN W/O DYE: CPT | Mod: 26

## 2017-07-22 RX ORDER — CEFAZOLIN SODIUM 1 G
2000 VIAL (EA) INJECTION EVERY 8 HOURS
Qty: 0 | Refills: 0 | Status: DISCONTINUED | OUTPATIENT
Start: 2017-07-23 | End: 2017-07-23

## 2017-07-22 RX ORDER — HYDROCORTISONE 20 MG
25 TABLET ORAL EVERY 12 HOURS
Qty: 0 | Refills: 0 | Status: DISCONTINUED | OUTPATIENT
Start: 2017-07-22 | End: 2017-07-23

## 2017-07-22 RX ORDER — DEXTROSE MONOHYDRATE, SODIUM CHLORIDE, AND POTASSIUM CHLORIDE 50; .745; 4.5 G/1000ML; G/1000ML; G/1000ML
1000 INJECTION, SOLUTION INTRAVENOUS
Qty: 0 | Refills: 0 | Status: DISCONTINUED | OUTPATIENT
Start: 2017-07-22 | End: 2017-07-23

## 2017-07-22 RX ORDER — CEFAZOLIN SODIUM 1 G
VIAL (EA) INJECTION
Qty: 0 | Refills: 0 | Status: DISCONTINUED | OUTPATIENT
Start: 2017-07-23 | End: 2017-07-23

## 2017-07-22 RX ORDER — DESMOPRESSIN ACETATE 0.1 MG/1
0.05 TABLET ORAL ONCE
Qty: 0 | Refills: 0 | Status: COMPLETED | OUTPATIENT
Start: 2017-07-22 | End: 2017-07-22

## 2017-07-22 RX ORDER — CEFAZOLIN SODIUM 1 G
2000 VIAL (EA) INJECTION ONCE
Qty: 0 | Refills: 0 | Status: COMPLETED | OUTPATIENT
Start: 2017-07-22 | End: 2017-07-23

## 2017-07-22 RX ADMIN — PANTOPRAZOLE SODIUM 40 MILLIGRAM(S): 20 TABLET, DELAYED RELEASE ORAL at 11:29

## 2017-07-22 RX ADMIN — ENOXAPARIN SODIUM 40 MILLIGRAM(S): 100 INJECTION SUBCUTANEOUS at 17:58

## 2017-07-22 RX ADMIN — OXYCODONE HYDROCHLORIDE 5 MILLIGRAM(S): 5 TABLET ORAL at 12:45

## 2017-07-22 RX ADMIN — Medication 100 MILLIGRAM(S): at 22:37

## 2017-07-22 RX ADMIN — SENNA PLUS 2 TABLET(S): 8.6 TABLET ORAL at 22:37

## 2017-07-22 RX ADMIN — Medication 25 MILLIGRAM(S): at 18:21

## 2017-07-22 RX ADMIN — OXYCODONE HYDROCHLORIDE 5 MILLIGRAM(S): 5 TABLET ORAL at 13:15

## 2017-07-22 RX ADMIN — Medication 25 MILLIGRAM(S): at 05:52

## 2017-07-22 RX ADMIN — POTASSIUM PHOSPHATE, MONOBASIC POTASSIUM PHOSPHATE, DIBASIC 62.5 MILLIMOLE(S): 236; 224 INJECTION, SOLUTION INTRAVENOUS at 00:00

## 2017-07-22 RX ADMIN — POLYETHYLENE GLYCOL 3350 17 GRAM(S): 17 POWDER, FOR SOLUTION ORAL at 11:31

## 2017-07-22 RX ADMIN — DESMOPRESSIN ACETATE 0.05 MILLIGRAM(S): 0.1 TABLET ORAL at 16:22

## 2017-07-22 RX ADMIN — DEXTROSE MONOHYDRATE, SODIUM CHLORIDE, AND POTASSIUM CHLORIDE 75 MILLILITER(S): 50; .745; 4.5 INJECTION, SOLUTION INTRAVENOUS at 18:23

## 2017-07-22 RX ADMIN — OXYCODONE HYDROCHLORIDE 5 MILLIGRAM(S): 5 TABLET ORAL at 05:53

## 2017-07-22 RX ADMIN — OXYCODONE HYDROCHLORIDE 5 MILLIGRAM(S): 5 TABLET ORAL at 05:00

## 2017-07-22 RX ADMIN — Medication 325 MILLIGRAM(S): at 19:34

## 2017-07-22 RX ADMIN — Medication 25 MILLIGRAM(S): at 11:29

## 2017-07-22 RX ADMIN — Medication 650 MILLIGRAM(S): at 20:22

## 2017-07-22 RX ADMIN — ESCITALOPRAM OXALATE 10 MILLIGRAM(S): 10 TABLET, FILM COATED ORAL at 11:29

## 2017-07-22 RX ADMIN — Medication: at 17:36

## 2017-07-22 RX ADMIN — DEXTROSE MONOHYDRATE, SODIUM CHLORIDE, AND POTASSIUM CHLORIDE 75 MILLILITER(S): 50; .745; 4.5 INJECTION, SOLUTION INTRAVENOUS at 09:50

## 2017-07-22 RX ADMIN — Medication 100 MILLIGRAM(S): at 05:52

## 2017-07-22 RX ADMIN — Medication 100 MILLIGRAM(S): at 14:51

## 2017-07-22 RX ADMIN — OXYCODONE HYDROCHLORIDE 5 MILLIGRAM(S): 5 TABLET ORAL at 22:37

## 2017-07-22 NOTE — PROGRESS NOTE ADULT - SUBJECTIVE AND OBJECTIVE BOX
83 yo F s/p TSP for pituitary tumor and LD on 07/20/2017, complicated by intra-op CSF leak. History of TIA several months ago, takes ASA 81 daily.  Presents with worsening vision.  Baseline was blurry vision but claims she can now only see shadows in the past 2 weeks in both eyes. No acute HA. History provided by grandson and daughter.    Overnight Events:     ROS:    DEVICES: [] Restraints [] GEO/HMV []LD [] ET tube [] Trach [] A-line [] Cerda [] NGT [] Rectal Tube SUMMARY:   82F history of TIA and previous partial pituitary adenoma resection in 1997 s/p TSP for pituitary tumor and LD on 07/20/2017, complicated by intra-op CSF leak. Some initial concern for increased urine output and DI, but Na stable.    OVERNIGHT EVENTS:  Lumbar drain transiently stopped working, but now functional again.    ROS: Mild non-positional headache, no chest pain    VITALS/MEDS/LABS/IMAGING: [x] Reviewed  I/O: -500cc  Na 147    DEVICES: [x]LD [x] Cerda      EXAMINATION:  Awake, alert, fully oriented, follows, PERRL, VFFtc, EOMI, face symmetric, moves all limbs with full strength, lungs clear, heart regular but bradycardic, abdomen soft, no c/c/e

## 2017-07-22 NOTE — PROGRESS NOTE ADULT - SUBJECTIVE AND OBJECTIVE BOX
POD/STATUS POST/ENT ISSUE: pt seen     INTERVAL HPI:     Vital Signs Last 24 Hrs  T(C): 37.1 (22 Jul 2017 07:00), Max: 37.1 (22 Jul 2017 07:00)  T(F): 98.7 (22 Jul 2017 07:00), Max: 98.7 (22 Jul 2017 07:00)  HR: 54 (22 Jul 2017 10:00) (50 - 69)  BP: 149/69 (22 Jul 2017 10:00) (115/61 - 177/81)  BP(mean): 99 (22 Jul 2017 10:00) (76 - 124)  RR: 18 (22 Jul 2017 10:00) (11 - 26)  SpO2: 93% (22 Jul 2017 10:00) (91% - 96%)    PHYSICAL EXAM:  Gen: NAD, well-developed  Head: Normocephalic, Atraumatic  Eyes: PERRL, EOMI, no scleral injection  Ears: Right - ear canal clear, TM intact without effusion            Left - ear canal clear, TM intact without effusion  Nose: Nares bilaterally patent, no discharge  Mouth: Mucosa moist, tongue/uvula midline, oropharynx clear  Neck: Flat, supple, no lymphadenopathy, trachea midline, no masses  Resp: breathing easily, no stridor  CV: no peripheral edema/cyanosis    LABS:                        12.3   13.2  )-----------( 132      ( 21 Jul 2017 22:51 )             35.3 POD/STATUS POST/ENT ISSUE: s/p TSRP mass with CSF leak b/l nasal packing in place. pt with LD in place    INTERVAL HPI: pt seen and examined denies any epistaxis or drainage from nose c/o headache no visual changes, tolerating diet    Vital Signs Last 24 Hrs  T(C): 37.1 (22 Jul 2017 07:00), Max: 37.1 (22 Jul 2017 07:00)  T(F): 98.7 (22 Jul 2017 07:00), Max: 98.7 (22 Jul 2017 07:00)  HR: 54 (22 Jul 2017 10:00) (50 - 69)  BP: 149/69 (22 Jul 2017 10:00) (115/61 - 177/81)  BP(mean): 99 (22 Jul 2017 10:00) (76 - 124)  RR: 18 (22 Jul 2017 10:00) (11 - 26)  SpO2: 93% (22 Jul 2017 10:00) (91% - 96%)    PHYSICAL EXAM:  Gen: NAD, well-developed  Head: Normocephalic, Atraumatic  Eyes: PERRL, EOMI, no scleral injection  Nose: Nares bilaterally packed with merocel nasal packing no epistaxis no csf leak with maneuvers  Mouth: Mucosa moist, tongue/uvula midline, oropharynx clear no blood in Post OP  Neck: Flat, supple, no lymphadenopathy, trachea midline, no masses  Resp: breathing easily, no stridor      LABS:                        12.3   13.2  )-----------( 132      ( 21 Jul 2017 22:51 )             35.3

## 2017-07-22 NOTE — PROGRESS NOTE ADULT - PROBLEM SELECTOR PLAN 2
AM cortisol (on dex) was 18 which is sufficient.  Will taper off her steroids as she was getting hyperglycemic on high dose dex - now on 25 mg IV q6h hydrocortisone and will continue to taper to 25mg IV q12hrs. On 7/24 will give AM hydrocortisone but hold evening dose. Check AM cortisol on 7/25.

## 2017-07-22 NOTE — PROGRESS NOTE ADULT - ASSESSMENT
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 She underwent repeat TSS on 7/20.  Post op, her cortisol was 18 (on dex) and her TFT's have not yet been checked.  She was briefly on insulin gtt when was on high dose 10 mg q6h decadron but sugars improved after gtt stopped and steroids switched to 25 mg q6h IV hydrocortisone.  Watching for DI as serum Na tomasz to 147 today

## 2017-07-22 NOTE — PROGRESS NOTE ADULT - SUBJECTIVE AND OBJECTIVE BOX
SUMMARY:  82F s/p TSP for pituitary tumor and LD on 07/20/201, complicated by intra-op CSF leak. Some concern for increased urine output and DI, but Na stable.    OVERNIGHT EVENTS:  Lumbar drain temporarily stopped working but now functional again.    ROS:    DEVICES: [] Restraints [] GEO/HMV []LD [] ET tube [] Trach [] A-line [] Cerda [] NGT [] Rectal Tube

## 2017-07-22 NOTE — PROGRESS NOTE ADULT - PROBLEM SELECTOR PLAN 5
Primary team stopped dex, will cotninue to monitor sugars and SSI.  No h/o diabetes mellitus.
Primary team stopped dex, will cotninue to monitor sugars and SSI.  No h/o diabetes mellitus.

## 2017-07-22 NOTE — PROGRESS NOTE ADULT - SUBJECTIVE AND OBJECTIVE BOX
Chief Complaint: s/p pituitary TSS resection    Interval history: Pt had TSS yesterday, still with nasal packing.  Her serum Na went up to 148 max then declined recently to 143 without any intervention. She is NPO but is drinking about 600 cc water so far today, about 2 L out in jenkins (darker yellow colored urine).  She denies feeling of excessive thirst.  She does endorese HA.  She was on 10 mg q6h decadron post op but was switched earlier today to 25 mg IV hydrocort q6h to wean.      MEDICATIONS  (STANDING):  polyethylene glycol 3350 17 Gram(s) Oral daily  docusate sodium 100 milliGRAM(s) Oral three times a day  escitalopram 10 milliGRAM(s) Oral daily  pantoprazole  Injectable 40 milliGRAM(s) IV Push daily  enoxaparin Injectable 40 milliGRAM(s) SubCutaneous <User Schedule>  hydrocortisone sodium succinate Injectable 25 milliGRAM(s) IV Push every 6 hours  insulin lispro (HumaLOG) corrective regimen sliding scale   SubCutaneous three times a day before meals  dextrose 5%. 1000 milliLiter(s) (50 mL/Hr) IV Continuous <Continuous>  dextrose 50% Injectable 12.5 Gram(s) IV Push once  dextrose 50% Injectable 25 Gram(s) IV Push once  dextrose 50% Injectable 25 Gram(s) IV Push once  sodium chloride 0.9% with potassium chloride 20 mEq/L 1000 milliLiter(s) (75 mL/Hr) IV Continuous <Continuous>    MEDICATIONS  (PRN):  senna 2 Tablet(s) Oral at bedtime PRN Constipation  acetaminophen   Tablet 650 milliGRAM(s) Oral every 6 hours PRN For Temp greater than 38 C (100.4 F)  acetaminophen   Tablet. 650 milliGRAM(s) Oral every 6 hours PRN Mild Pain (1 - 3)  ondansetron Injectable 4 milliGRAM(s) IV Push every 6 hours PRN Nausea and/or Vomiting  oxyCODONE    IR 5 milliGRAM(s) Oral every 8 hours PRN Moderate Pain (4 - 6)  oxyCODONE    IR 10 milliGRAM(s) Oral every 8 hours PRN Severe Pain (7 - 10)  dextrose Gel 1 Dose(s) Oral once PRN Blood Glucose LESS THAN 70 milliGRAM(s)/deciliter  glucagon  Injectable 1 milliGRAM(s) IntraMuscular once PRN Glucose LESS THAN 70 milligrams/deciliter      Allergies    No Known Allergies    Intolerances      Review of Systems:  Constitutional: No fever  Eyes: No blurry vision  Neuro: No tremors  HEENT: No pain  Cardiovascular: No chest pain, palpitations  Respiratory: No SOB, no cough  GI: No nausea, vomiting, abdominal pain  : No dysuria  Skin: no rash  Psych: no depression  Endocrine: no polyuria, polydipsia  Hem/lymph: no swelling  Osteoporosis: no fractures    ALL OTHER SYSTEMS REVIEWED AND NEGATIVE    PHYSICAL EXAM:  VITALS: T(C): 36.1 (07-22-17 @ 11:00)  T(F): 97 (07-22-17 @ 11:00), Max: 98.7 (07-22-17 @ 07:00)  HR: 52 (07-22-17 @ 12:00) (50 - 69)  BP: 138/65 (07-22-17 @ 12:00) (115/61 - 177/81)  RR:  (12 - 26)  SpO2:  (91% - 96%)  Wt(kg): --  GENERAL: NAD, well-groomed, well-developed  EYES: No proptosis, no lid lag, anicteric  HEENT:  Atraumatic, Normocephalic, moist mucous membranes  THYROID: Normal size, no palpable nodules  RESPIRATORY: Clear to auscultation bilaterally; No rales, rhonchi, wheezing, or rubs  CARDIOVASCULAR: Regular rate and rhythm; No murmurs; no peripheral edema  GI: Soft, nontender, non distended, normal bowel sounds  SKIN: Dry, intact, No rashes or lesions  MUSCULOSKELETAL: Full range of motion, normal strength  NEURO: sensation intact, extraocular movements intact, no tremor, normal reflexes  PSYCH: Alert and oriented x 3, normal affect, normal mood  CUSHING'S SIGNS: no striae    CAPILLARY BLOOD GLUCOSE  119 (07-21 @ 18:00)  121 (07-21 @ 12:00)  117 (07-21 @ 11:00)  124 (07-21 @ 10:00)  130 (07-21 @ 09:00)  129 (07-21 @ 08:00)  128 (07-21 @ 07:00)  125 (07-21 @ 06:00)  134 (07-21 @ 05:00)  127 (07-21 @ 04:00)  190 (07-21 @ 03:00)  193 (07-21 @ 02:00)  195 (07-21 @ 01:00)  238 (07-21 @ 00:00)  295 (07-20 @ 23:00)  269 (07-20 @ 22:00)  250 (07-20 @ 21:00)  158 (07-20 @ 08:22)  151 (07-20 @ 05:51)  185 (07-20 @ 00:21)  228 (07-19 @ 21:44)      07-22    147<H>  |  116<H>  |  8   ----------------------------<  137<H>  5.4<H>   |  22  |  0.63    EGFR if : 97  EGFR if non : 84    Ca    7.8<L>      07-22  Mg     2.2     07-21  Phos  1.9     07-21            Thyroid Function Tests:  07-18 @ 20:08 TSH 1.92 FreeT4 -- T3 -- Anti TPO -- Anti Thyroglobulin Ab -- TSI --  07-01 @ 07:00 TSH -- FreeT4 1.06 T3 -- Anti TPO -- Anti Thyroglobulin Ab -- TSI --      Hemoglobin A1C, Whole Blood: 5.6 % [4.0 - 5.6] (07-21-17 @ 17:58)

## 2017-07-22 NOTE — PROGRESS NOTE ADULT - PROBLEM SELECTOR PLAN 1
s/p TSRP mass  nasal packing in place continue ABX for packing prophylaxis  Monitor for signs of CSF leak  HOB elevation avoid nasal trauma/straining  NeuroSx care

## 2017-07-22 NOTE — PROGRESS NOTE ADULT - ASSESSMENT
Summary: 81yo woman POD#1 TSP for pituitary rxn, LD placement; intraop CSF leak repaired w/ alloderm, duragen, nasoseptal flap.    NEURO:  q1h neuro checks, pain control, CTH in am, MRI, skull base precaution, am cortisol x3, DI watch - urine output high see endocrine, decadron taper per neurosx, LD at 5cc/hr till Sunday, clamp on Monday per neurosx    CARDS:  -150    PULM:  sat > 92%    RENAL:  IVL    GASTRO:  advance as tolerated  ---> Stress ulcer prophylaxis:  PPI not needed  - start feeds once, insulin drip is off.     HEME:  monitor H/H    ---> DVT prophylaxis: SCDs, start Lovenox     ENDO:  NPO - on insulin drip. f/up Na q8h, Urine spec grav. f/up endocrine labs, will likely need ddavp. convert - insulin drip to premeal and lantus    ID:  afebrile    MISC: none    SOCIAL/FAMILY: updated at bedside    CODE STATUS: Full Code     DISPOSITION: ICU    Patient is at high risk of neurologic deterioration/death due to: pituitary failure    Time spent: 45 critical care minutes SUMMARY/PLAN: TSP POD 2 c/b CSF leak status post LD    NEURO:  q1h neuro checks, pain control, CTH in am for pneumocephalus, MRI post-op, skull base precautions, am cortisol x3 days, LD at 5cc/hr through Sunday and clamp on Monday per neurosx  Activity: [x] mobilize as tolerated [] Bedrest [] PT [] OT [] PMNR    PULM:  RA, skull base precautions (no straws, etc)    CV:  SBP goal 100-150mmHg    RENAL:  Fluids: IVF, drink to thirst  Maintain jenkins for one more day to assess fluid status/monitor for DI    GI:  Diet: CCD  GI prophylaxis [x] not indicated [] PPI [] other:  Bowel regimen [x] colace [x] senna [x] other: Miralax    ENDO:   Hydrocortisone taper per Endocrine  Monitor for need for ddAVP for DI; drink to thirst  Hyperglycemia: previously on insulin gtt, currently on RISS  Goal euglycemia (-180)    HEME/ONC:  VTE prophylaxis: [x] SCDs [x] chemoprophylaxis [] hold chemoprophylaxis due to: [] high risk of DVT/PE on admission due to:    ID: Afebrile    SOCIAL/FAMILY:  [x] awaiting [] updated at bedside [] family meeting    CODE STATUS:  [x] Full Code [] DNR [] DNI [] Palliative/Comfort Care    DISPOSITION:  [x] ICU [] Stroke Unit [] Floor [] EMU [] RCU [] PCU    [x] Patient is at high risk of neurologic deterioration/death due to: Pituitary failure, pneumocephalus, DI    Time seen: 9:45AM  Time spent: 45 [x] critical care minutes    Contact: [phone] SUMMARY/PLAN: TSP POD 2 c/b CSF leak status post LD    NEURO:  q1h neuro checks, pain control, CTH in am for pneumocephalus, MRI post-op, skull base precautions, am cortisol x3 days, LD at 5cc/hr through Sunday and clamp on Monday per neurosx  Activity: [x] mobilize as tolerated [] Bedrest [] PT [] OT [] PMNR    PULM:  RA, skull base precautions (no straws, etc)    CV:  SBP goal 100-150mmHg  Check EKG for bradycardia    RENAL:  Fluids: IVF, drink to thirst  Maintain jenkins for one more day to assess fluid status/monitor for DI    GI:  Diet: CCD  GI prophylaxis [x] not indicated [] PPI [] other:  Bowel regimen [x] colace [x] senna [x] other: Miralax    ENDO:   Hydrocortisone taper per Endocrine  Monitor for need for ddAVP for DI; drink to thirst  Hyperglycemia: previously on insulin gtt, currently on RISS  Goal euglycemia (-180)    HEME/ONC:  VTE prophylaxis: [x] SCDs [x] chemoprophylaxis [] hold chemoprophylaxis due to: [] high risk of DVT/PE on admission due to:    ID: Afebrile    SOCIAL/FAMILY:  [x] awaiting [] updated at bedside [] family meeting    CODE STATUS:  [x] Full Code [] DNR [] DNI [] Palliative/Comfort Care    DISPOSITION:  [x] ICU [] Stroke Unit [] Floor [] EMU [] RCU [] PCU    [x] Patient is at high risk of neurologic deterioration/death due to: Pituitary failure, pneumocephalus, DI    Time seen: 9:45AM  Time spent: 45 [x] critical care minutes    Contact: [phone]

## 2017-07-22 NOTE — PROGRESS NOTE ADULT - ATTENDING COMMENTS
Patient seen and examined. No evidence of CSF leak. Plan to remove packing tomorrow and then can d/c abx and start saline spray.

## 2017-07-22 NOTE — PROGRESS NOTE ADULT - ASSESSMENT
Summary: 81yo woman POD#1 TSP for pituitary rxn, LD placement; intraop CSF leak repaired w/ alloderm, duragen, nasoseptal flap.    NEURO:  q1h neuro checks, pain control, CTH in am, MRI, skull base precaution, am cortisol x3, DI watch - urine output high see endocrine, decadron taper per neurosx, LD at 5cc/hr till Sunday, clamp on Monday per neurosx    CARDS:  -150    PULM:  sat > 92%    RENAL:  IVL    GASTRO:  advance as tolerated  ---> Stress ulcer prophylaxis:  PPI not needed  - start feeds once, insulin drip is off.     HEME:  monitor H/H    ---> DVT prophylaxis: SCDs, start Lovenox     ENDO:  NPO - on insulin drip. f/up Na q8h, Urine spec grav. f/up endocrine labs, will likely need ddavp. 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

## 2017-07-23 LAB
ALBUMIN SERPL ELPH-MCNC: 3.2 G/DL — LOW (ref 3.3–5)
ALP SERPL-CCNC: 48 U/L — SIGNIFICANT CHANGE UP (ref 40–120)
ALT FLD-CCNC: 18 U/L RC — SIGNIFICANT CHANGE UP (ref 10–45)
ANION GAP SERPL CALC-SCNC: 10 MMOL/L — SIGNIFICANT CHANGE UP (ref 5–17)
AST SERPL-CCNC: 19 U/L — SIGNIFICANT CHANGE UP (ref 10–40)
BILIRUB SERPL-MCNC: 1.1 MG/DL — SIGNIFICANT CHANGE UP (ref 0.2–1.2)
BUN SERPL-MCNC: 8 MG/DL — SIGNIFICANT CHANGE UP (ref 7–23)
CALCIUM SERPL-MCNC: 8 MG/DL — LOW (ref 8.4–10.5)
CHLORIDE SERPL-SCNC: 107 MMOL/L — SIGNIFICANT CHANGE UP (ref 96–108)
CO2 SERPL-SCNC: 23 MMOL/L — SIGNIFICANT CHANGE UP (ref 22–31)
CORTIS AM PEAK SERPL-MCNC: 6.5 UG/DL — SIGNIFICANT CHANGE UP (ref 6–18.4)
CREAT SERPL-MCNC: 0.63 MG/DL — SIGNIFICANT CHANGE UP (ref 0.5–1.3)
GLUCOSE SERPL-MCNC: 151 MG/DL — HIGH (ref 70–99)
HCT VFR BLD CALC: 33.1 % — LOW (ref 34.5–45)
HGB BLD-MCNC: 11.6 G/DL — SIGNIFICANT CHANGE UP (ref 11.5–15.5)
MAGNESIUM SERPL-MCNC: 1.9 MG/DL — SIGNIFICANT CHANGE UP (ref 1.6–2.6)
MCHC RBC-ENTMCNC: 35 GM/DL — SIGNIFICANT CHANGE UP (ref 32–36)
MCHC RBC-ENTMCNC: 36.4 PG — HIGH (ref 27–34)
MCV RBC AUTO: 104 FL — HIGH (ref 80–100)
PHOSPHATE SERPL-MCNC: 2.5 MG/DL — SIGNIFICANT CHANGE UP (ref 2.5–4.5)
PLATELET # BLD AUTO: 110 K/UL — LOW (ref 150–400)
POTASSIUM SERPL-MCNC: 3.7 MMOL/L — SIGNIFICANT CHANGE UP (ref 3.5–5.3)
POTASSIUM SERPL-SCNC: 3.7 MMOL/L — SIGNIFICANT CHANGE UP (ref 3.5–5.3)
PROT SERPL-MCNC: 5.3 G/DL — LOW (ref 6–8.3)
RBC # BLD: 3.17 M/UL — LOW (ref 3.8–5.2)
RBC # FLD: 11.5 % — SIGNIFICANT CHANGE UP (ref 10.3–14.5)
SODIUM SERPL-SCNC: 140 MMOL/L — SIGNIFICANT CHANGE UP (ref 135–145)
WBC # BLD: 7.3 K/UL — SIGNIFICANT CHANGE UP (ref 3.8–10.5)
WBC # FLD AUTO: 7.3 K/UL — SIGNIFICANT CHANGE UP (ref 3.8–10.5)

## 2017-07-23 PROCEDURE — 99233 SBSQ HOSP IP/OBS HIGH 50: CPT

## 2017-07-23 RX ORDER — HYDROCORTISONE 20 MG
20 TABLET ORAL ONCE
Qty: 0 | Refills: 0 | Status: COMPLETED | OUTPATIENT
Start: 2017-07-24 | End: 2017-07-24

## 2017-07-23 RX ORDER — FOLIC ACID 0.8 MG
1 TABLET ORAL DAILY
Qty: 0 | Refills: 0 | Status: DISCONTINUED | OUTPATIENT
Start: 2017-07-23 | End: 2017-07-26

## 2017-07-23 RX ORDER — PREGABALIN 225 MG/1
100 CAPSULE ORAL DAILY
Qty: 0 | Refills: 0 | Status: DISCONTINUED | OUTPATIENT
Start: 2017-07-23 | End: 2017-07-26

## 2017-07-23 RX ORDER — MULTIVIT-MIN/FERROUS GLUCONATE 9 MG/15 ML
1 LIQUID (ML) ORAL DAILY
Qty: 0 | Refills: 0 | Status: DISCONTINUED | OUTPATIENT
Start: 2017-07-23 | End: 2017-07-26

## 2017-07-23 RX ADMIN — SENNA PLUS 2 TABLET(S): 8.6 TABLET ORAL at 11:55

## 2017-07-23 RX ADMIN — ESCITALOPRAM OXALATE 10 MILLIGRAM(S): 10 TABLET, FILM COATED ORAL at 11:55

## 2017-07-23 RX ADMIN — OXYCODONE HYDROCHLORIDE 5 MILLIGRAM(S): 5 TABLET ORAL at 00:00

## 2017-07-23 RX ADMIN — Medication 25 MILLIGRAM(S): at 17:47

## 2017-07-23 RX ADMIN — Medication 100 MILLIGRAM(S): at 00:14

## 2017-07-23 RX ADMIN — OXYCODONE HYDROCHLORIDE 5 MILLIGRAM(S): 5 TABLET ORAL at 22:30

## 2017-07-23 RX ADMIN — Medication 100 MILLIGRAM(S): at 11:56

## 2017-07-23 RX ADMIN — Medication 650 MILLIGRAM(S): at 05:27

## 2017-07-23 RX ADMIN — Medication 100 MILLIGRAM(S): at 05:28

## 2017-07-23 RX ADMIN — PREGABALIN 100 MICROGRAM(S): 225 CAPSULE ORAL at 11:55

## 2017-07-23 RX ADMIN — ENOXAPARIN SODIUM 40 MILLIGRAM(S): 100 INJECTION SUBCUTANEOUS at 17:46

## 2017-07-23 RX ADMIN — Medication 100 MILLIGRAM(S): at 16:23

## 2017-07-23 RX ADMIN — Medication 100 MILLIGRAM(S): at 05:27

## 2017-07-23 RX ADMIN — Medication: at 12:24

## 2017-07-23 RX ADMIN — Medication 25 MILLIGRAM(S): at 06:12

## 2017-07-23 RX ADMIN — OXYCODONE HYDROCHLORIDE 5 MILLIGRAM(S): 5 TABLET ORAL at 11:55

## 2017-07-23 RX ADMIN — OXYCODONE HYDROCHLORIDE 5 MILLIGRAM(S): 5 TABLET ORAL at 12:09

## 2017-07-23 RX ADMIN — PANTOPRAZOLE SODIUM 40 MILLIGRAM(S): 20 TABLET, DELAYED RELEASE ORAL at 11:56

## 2017-07-23 RX ADMIN — Medication 1 MILLIGRAM(S): at 11:55

## 2017-07-23 RX ADMIN — OXYCODONE HYDROCHLORIDE 5 MILLIGRAM(S): 5 TABLET ORAL at 22:00

## 2017-07-23 RX ADMIN — OXYCODONE HYDROCHLORIDE 10 MILLIGRAM(S): 5 TABLET ORAL at 06:43

## 2017-07-23 RX ADMIN — POLYETHYLENE GLYCOL 3350 17 GRAM(S): 17 POWDER, FOR SOLUTION ORAL at 11:56

## 2017-07-23 RX ADMIN — Medication 1 TABLET(S): at 13:23

## 2017-07-23 RX ADMIN — OXYCODONE HYDROCHLORIDE 10 MILLIGRAM(S): 5 TABLET ORAL at 06:12

## 2017-07-23 NOTE — PROGRESS NOTE ADULT - SUBJECTIVE AND OBJECTIVE BOX
POD/STATUS POST/ENT ISSUE: s/p TSRP mass with CSF leak repair b/l nasal packing in place    INTERVAL HPI: pt seen and examined no events overnight denies leakage or bleeding from nose/no hemoptysis/+headache-improving/no visual changes or vertigo    Vital Signs Last 24 Hrs  T(C): 36.5 (23 Jul 2017 03:00), Max: 36.7 (22 Jul 2017 15:00)  T(F): 97.7 (23 Jul 2017 03:00), Max: 98 (22 Jul 2017 15:00)  HR: 54 (23 Jul 2017 07:00) (51 - 68)  BP: 165/77 (23 Jul 2017 07:00) (126/59 - 174/80)  BP(mean): 111 (23 Jul 2017 07:00) (85 - 120)  RR: 11 (23 Jul 2017 07:00) (11 - 26)  SpO2: 93% (23 Jul 2017 07:00) (92% - 96%)    PHYSICAL EXAM:  Gen: NAD, well-developed  Head: Normocephalic, Atraumatic  Eyes: PERRL, EOMI, no scleral injection  Nose: Nares bilaterally packed with Merocel-irrigated b/l nares with saline, b/l packs removed slowly, with some oozing of residual blood  no obvious CSF rhinorrhea or further epistaxis, mustache dressing applied  Mouth: Mucosa moist, tongue/uvula midline, oropharynx clear  Neck: Flat, supple, no lymphadenopathy, trachea midline, no masses  Resp: breathing easily, no stridor  CV: no peripheral edema/cyanosis    LABS:                        11.6   7.3   )-----------( 110      ( 23 Jul 2017 01:19 )             33.1

## 2017-07-23 NOTE — PROGRESS NOTE ADULT - SUBJECTIVE AND OBJECTIVE BOX
SUMMARY:   82F history of TIA and previous partial pituitary adenoma resection in 1997 s/p TSP for pituitary tumor and LD on 07/20/2017, complicated by intra-op CSF leak. Some initial concern for increased urine output and DI, but Na stable.  7/22 LD clamped, given 0.05mg DDAVP    ROS: Mild non-positional headache, no chest pain    VITALS/MEDS/LABS/IMAGING: [x] Reviewed  I/O: -3L  Na 140    DEVICES: [x]LD [x] Cerda      EXAMINATION:  Awake, alert, fully oriented, follows, PERRL, VFFtc, EOMI, face symmetric, moves all limbs with full strength, lungs clear, heart regular but bradycardic, abdomen soft, no c/c/e SUMMARY:   82F history of TIA and previous partial pituitary adenoma resection in 1997 s/p TSP for pituitary tumor and LD on 07/20/2017, complicated by intra-op CSF leak and diabetes insipidus. Lumbar drain clamped on 7/22 and d/c'd 7/23.    ROS: Mild non-positional headache, no chest pain    VITALS/MEDS/LABS/IMAGING: [x] Reviewed  I/O: -3L  Na 140    DEVICES: [x]LD [x] Cerda      EXAMINATION:  Awake, alert, fully oriented, follows, PERRL, VFFtc, EOMI, face symmetric, moves all limbs with full strength, lungs clear, heart regular but bradycardic, abdomen soft, no c/c/e

## 2017-07-23 NOTE — PROGRESS NOTE ADULT - ASSESSMENT
SUMMARY/PLAN: TSP POD 5 c/b CSF leak status post LD    NEURO:  q4h neuro checks, pain control, MRI post-op, skull base precautions, am cortisol x3 days, LD clamped on 7/22, D/C per neurosx  Activity: [x] mobilize as tolerated [] Bedrest [] PT [] OT [] PMNR    PULM:  RA, skull base precautions (no straws, etc)    CV:  SBP goal 100-150mmHg  Check EKG for bradycardia    RENAL:  Fluids: IVF, drink to thirst  Maintain jenkins for one more day to assess fluid status/monitor for DI    GI:  Diet: CCD  GI prophylaxis [x] not indicated [] PPI [] other:  Bowel regimen [x] colace [x] senna [x] other: Miralax    ENDO:   Hydrocortisone taper per Endocrine  Monitor for need for ddAVP for DI; drink to thirst  Hyperglycemia: previously on insulin gtt, currently on RISS  Goal euglycemia (-180)    HEME/ONC:  VTE prophylaxis: [x] SCDs [x] chemoprophylaxis [] hold chemoprophylaxis due to: [] high risk of DVT/PE on admission due to:    ID: Afebrile    SOCIAL/FAMILY:  [x] awaiting [] updated at bedside [] family meeting    CODE STATUS:  [x] Full Code [] DNR [] DNI [] Palliative/Comfort Care    DISPOSITION:  [x] ICU [] Stroke Unit [] Floor [] EMU [] RCU [] PCU    [x] Patient is at high risk of neurologic deterioration/death due to: Pituitary failure, pneumocephalus, DI    Time seen: 10:15AM  Time spent: 45 [x] critical care minutes    Contact: [phone]

## 2017-07-23 NOTE — PROGRESS NOTE ADULT - ATTENDING COMMENTS
Post-operative day 5 from transphenoidal pituitary adenoma resection. Lumbar drain removed without evidence of cerebrospinal fluid leak.    Awake, alert, fully oriented, follows, PERRL, EOMI, no gross field cut, no drift, full strength    PT/OT/OOB  Pain control  Monitor for DI

## 2017-07-23 NOTE — PROGRESS NOTE ADULT - PROBLEM SELECTOR PLAN 1
Monitor for further CSF leak  HOB elevation, avoid nasal trauma/straining cough and sneeze with mouth open  above discussed with patient in detail verbalized understanding  Mustache dressing changes prn  NeuroSx Care

## 2017-07-24 DIAGNOSIS — E23.0 HYPOPITUITARISM: ICD-10-CM

## 2017-07-24 LAB
ALBUMIN SERPL ELPH-MCNC: 3.4 G/DL — SIGNIFICANT CHANGE UP (ref 3.3–5)
ALP SERPL-CCNC: 60 U/L — SIGNIFICANT CHANGE UP (ref 40–120)
ALT FLD-CCNC: 19 U/L RC — SIGNIFICANT CHANGE UP (ref 10–45)
ANION GAP SERPL CALC-SCNC: 14 MMOL/L — SIGNIFICANT CHANGE UP (ref 5–17)
AST SERPL-CCNC: 18 U/L — SIGNIFICANT CHANGE UP (ref 10–40)
BASOPHILS # BLD AUTO: 0 K/UL — SIGNIFICANT CHANGE UP (ref 0–0.2)
BASOPHILS NFR BLD AUTO: 0.1 % — SIGNIFICANT CHANGE UP (ref 0–2)
BILIRUB SERPL-MCNC: 1.1 MG/DL — SIGNIFICANT CHANGE UP (ref 0.2–1.2)
BUN SERPL-MCNC: 10 MG/DL — SIGNIFICANT CHANGE UP (ref 7–23)
CALCIUM SERPL-MCNC: 8.5 MG/DL — SIGNIFICANT CHANGE UP (ref 8.4–10.5)
CHLORIDE SERPL-SCNC: 105 MMOL/L — SIGNIFICANT CHANGE UP (ref 96–108)
CO2 SERPL-SCNC: 23 MMOL/L — SIGNIFICANT CHANGE UP (ref 22–31)
CREAT SERPL-MCNC: 0.56 MG/DL — SIGNIFICANT CHANGE UP (ref 0.5–1.3)
EOSINOPHIL # BLD AUTO: 0 K/UL — SIGNIFICANT CHANGE UP (ref 0–0.5)
EOSINOPHIL NFR BLD AUTO: 0.4 % — SIGNIFICANT CHANGE UP (ref 0–6)
GLUCOSE SERPL-MCNC: 182 MG/DL — HIGH (ref 70–99)
HCT VFR BLD CALC: 37.4 % — SIGNIFICANT CHANGE UP (ref 34.5–45)
HGB BLD-MCNC: 13.4 G/DL — SIGNIFICANT CHANGE UP (ref 11.5–15.5)
LYMPHOCYTES # BLD AUTO: 1.3 K/UL — SIGNIFICANT CHANGE UP (ref 1–3.3)
LYMPHOCYTES # BLD AUTO: 18.2 % — SIGNIFICANT CHANGE UP (ref 13–44)
MAGNESIUM SERPL-MCNC: 2.2 MG/DL — SIGNIFICANT CHANGE UP (ref 1.6–2.6)
MCHC RBC-ENTMCNC: 35.7 GM/DL — SIGNIFICANT CHANGE UP (ref 32–36)
MCHC RBC-ENTMCNC: 36.8 PG — HIGH (ref 27–34)
MCV RBC AUTO: 103 FL — HIGH (ref 80–100)
MONOCYTES # BLD AUTO: 0.6 K/UL — SIGNIFICANT CHANGE UP (ref 0–0.9)
MONOCYTES NFR BLD AUTO: 9 % — SIGNIFICANT CHANGE UP (ref 2–14)
NEUTROPHILS # BLD AUTO: 5.1 K/UL — SIGNIFICANT CHANGE UP (ref 1.8–7.4)
NEUTROPHILS NFR BLD AUTO: 72.2 % — SIGNIFICANT CHANGE UP (ref 43–77)
PHOSPHATE SERPL-MCNC: 3.5 MG/DL — SIGNIFICANT CHANGE UP (ref 2.5–4.5)
PLATELET # BLD AUTO: 123 K/UL — LOW (ref 150–400)
POTASSIUM SERPL-MCNC: 3.9 MMOL/L — SIGNIFICANT CHANGE UP (ref 3.5–5.3)
POTASSIUM SERPL-SCNC: 3.9 MMOL/L — SIGNIFICANT CHANGE UP (ref 3.5–5.3)
PROT SERPL-MCNC: 5.9 G/DL — LOW (ref 6–8.3)
RBC # BLD: 3.63 M/UL — LOW (ref 3.8–5.2)
RBC # FLD: 11.6 % — SIGNIFICANT CHANGE UP (ref 10.3–14.5)
SODIUM SERPL-SCNC: 142 MMOL/L — SIGNIFICANT CHANGE UP (ref 135–145)
WBC # BLD: 7.1 K/UL — SIGNIFICANT CHANGE UP (ref 3.8–10.5)
WBC # FLD AUTO: 7.1 K/UL — SIGNIFICANT CHANGE UP (ref 3.8–10.5)

## 2017-07-24 PROCEDURE — 99233 SBSQ HOSP IP/OBS HIGH 50: CPT

## 2017-07-24 PROCEDURE — 99024 POSTOP FOLLOW-UP VISIT: CPT

## 2017-07-24 PROCEDURE — 99232 SBSQ HOSP IP/OBS MODERATE 35: CPT | Mod: GC

## 2017-07-24 PROCEDURE — 74000: CPT | Mod: 26

## 2017-07-24 RX ORDER — HYDROCORTISONE 20 MG
20 TABLET ORAL
Qty: 0 | Refills: 0 | Status: DISCONTINUED | OUTPATIENT
Start: 2017-07-24 | End: 2017-07-25

## 2017-07-24 RX ORDER — SODIUM CHLORIDE 0.65 %
2 AEROSOL, SPRAY (ML) NASAL
Qty: 0 | Refills: 0 | Status: DISCONTINUED | OUTPATIENT
Start: 2017-07-24 | End: 2017-07-26

## 2017-07-24 RX ADMIN — Medication 650 MILLIGRAM(S): at 20:20

## 2017-07-24 RX ADMIN — Medication 100 MILLIGRAM(S): at 11:13

## 2017-07-24 RX ADMIN — Medication 650 MILLIGRAM(S): at 11:18

## 2017-07-24 RX ADMIN — PREGABALIN 100 MICROGRAM(S): 225 CAPSULE ORAL at 11:13

## 2017-07-24 RX ADMIN — Medication 650 MILLIGRAM(S): at 12:00

## 2017-07-24 RX ADMIN — OXYCODONE HYDROCHLORIDE 10 MILLIGRAM(S): 5 TABLET ORAL at 03:30

## 2017-07-24 RX ADMIN — Medication 2 SPRAY(S): at 17:59

## 2017-07-24 RX ADMIN — POLYETHYLENE GLYCOL 3350 17 GRAM(S): 17 POWDER, FOR SOLUTION ORAL at 11:14

## 2017-07-24 RX ADMIN — SENNA PLUS 2 TABLET(S): 8.6 TABLET ORAL at 11:14

## 2017-07-24 RX ADMIN — Medication 1 TABLET(S): at 11:14

## 2017-07-24 RX ADMIN — Medication 1 MILLIGRAM(S): at 11:14

## 2017-07-24 RX ADMIN — Medication 20 MILLIGRAM(S): at 06:03

## 2017-07-24 RX ADMIN — OXYCODONE HYDROCHLORIDE 10 MILLIGRAM(S): 5 TABLET ORAL at 03:00

## 2017-07-24 RX ADMIN — ENOXAPARIN SODIUM 40 MILLIGRAM(S): 100 INJECTION SUBCUTANEOUS at 18:04

## 2017-07-24 RX ADMIN — ESCITALOPRAM OXALATE 10 MILLIGRAM(S): 10 TABLET, FILM COATED ORAL at 11:14

## 2017-07-24 RX ADMIN — PANTOPRAZOLE SODIUM 40 MILLIGRAM(S): 20 TABLET, DELAYED RELEASE ORAL at 11:14

## 2017-07-24 RX ADMIN — OXYCODONE HYDROCHLORIDE 5 MILLIGRAM(S): 5 TABLET ORAL at 22:22

## 2017-07-24 RX ADMIN — OXYCODONE HYDROCHLORIDE 5 MILLIGRAM(S): 5 TABLET ORAL at 22:50

## 2017-07-24 RX ADMIN — Medication: at 11:13

## 2017-07-24 RX ADMIN — Medication 650 MILLIGRAM(S): at 06:00

## 2017-07-24 RX ADMIN — Medication 100 MILLIGRAM(S): at 20:57

## 2017-07-24 RX ADMIN — Medication 650 MILLIGRAM(S): at 19:50

## 2017-07-24 RX ADMIN — Medication 650 MILLIGRAM(S): at 06:30

## 2017-07-24 NOTE — PROGRESS NOTE ADULT - PROBLEM SELECTOR PLAN 1
s/p TSS on 7/20.  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  Vision improving post op per her report.

## 2017-07-24 NOTE — PROGRESS NOTE ADULT - PROBLEM SELECTOR PLAN 4
pre op TFT's normal.  Will need paired TSH and free t4 drawn prior to discharge.

## 2017-07-24 NOTE — PROGRESS NOTE ADULT - ATTENDING COMMENTS
Pt seen and examined. Agree with note as above with addendum, pt complains of abdominal pain but on exam her stomach is soft. PCA translated for me from Ethiopian and patient noted that she had a bm about 20 minutes prior. PCA and I agreed that primary team should further evaluate and he relayed this info to the RN Pt seen and examined. Agree with note as above with addendum, pt complains of abdominal pain but on exam her stomach is soft. PCA translated for me from Faroese and patient noted that she had a bm about 20 minutes prior. I discussed this with FARRAH Gutierrez so that the primary team can f/u.

## 2017-07-24 NOTE — PHYSICAL THERAPY INITIAL EVALUATION ADULT - CRITERIA FOR SKILLED THERAPEUTIC INTERVENTIONS
functional limitations in following categories/rehab potential/anticipated equipment needs at discharge/therapy frequency/anticipated discharge recommendation/impairments found/predicted duration of therapy intervention

## 2017-07-24 NOTE — PROGRESS NOTE ADULT - ASSESSMENT
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 She underwent repeat TSS on 7/20.  Post op, her cortisol was 18 (on dex) and her TFT's have not yet been checked.  She was briefly on insulin gtt when was on high dose 10 mg q6h decadron but sugars improved after gtt stopped. Patient transitioned to hydrocortisone 20mg in AM and 10mg in afternoon.

## 2017-07-24 NOTE — PHYSICAL THERAPY INITIAL EVALUATION ADULT - ADDITIONAL COMMENTS
Pt reports prior to admission she was generally independent requiring some assistance from her daughter with ADLs, such as sweeping her floor. Pt denies using any assistive device prior to admission. Pt states she lives in an apartment with her daughter (50 years old). As per pt she has 4 steps to enter her apartment, handrails on the right going up, and no elevator access. As per care coordinator report, pt family stated pt has 4 flights of stairs to negotiate into apartment. PT attempted to call emergency contact/daughter Paige [phone] for clarification on home setup. (PMH Cont.): Pt states she can only see shadows now has no true visual acuity. PMH: TIA, Depression      Pt reports prior to admission she was generally independent requiring some assistance from her daughter with ADLs, such as sweeping her floor. Pt denies using any assistive device prior to admission. Pt states she lives in an apartment with her daughter (50 years old). As per pt she has 4 steps to enter her apartment, handrails on the right going up, and no elevator access. As per care coordinator report, pt family stated pt has 4 flights of stairs to negotiate into apartment. PT attempted to call emergency contact/daughter Paige [phone] for clarification on home setup. (PMH Cont.): Pt states she can only see shadows now has no true visual acuity. PMH: TIA, Depression      Pt reports prior to admission she was generally independent requiring some assistance from her daughter with ADLs, such as sweeping her floor. Pt denies using any assistive device prior to admission. Pt states she lives in an apartment with her daughter (50 years old). As per pt she has 4 steps to enter her apartment, handrails on the right going up, and no elevator access. As per care coordinator report, pt family stated pt has 4 flights of stairs to negotiate into apartment. PT attempted to call emergency contact/daughter Paige [phone] for clarification on home setup with no answer. PT department to follow up. Pt reports prior to admission she was generally independent requiring some assistance from her daughter with ADLs, such as sweeping her floor. Pt denies using any assistive device prior to admission. Pt states she lives in an apartment with her daughter (50 years old). As per pt she has 4 steps to enter her apartment, handrails on the right going up, and no elevator access. As per care coordinator report, pt family stated pt has 4 flights of stairs to negotiate into apartment. PT attempted to call emergency contact/daughter Paige [phone] for clarification on home setup with no answer. PT department to follow up.

## 2017-07-24 NOTE — PROGRESS NOTE ADULT - ASSESSMENT
SUMMARY/PLAN: TSP POD 6 c/b CSF leak status post LD--d/c'ed on 7/23    NEURO:  q4h neuro checks, pain control, MRI post-op, skull base precautions, am cortisol x3 days, LD clamped on 7/22, d/c'ed on 7/23  Activity: [x] mobilize as tolerated [] Bedrest [x] PT [] OT [] PMNR    PULM:  RA, skull base precautions (no straws, etc)    CV:  SBP goal 100-150mmHg    RENAL:  Fluids: IVF, drink to thirst    GI:  Diet: CCD  GI prophylaxis [x] not indicated [] PPI [] other:  Bowel regimen [x] colace [x] senna [x] other: Miralax    ENDO:   Hydrocortisone taper per Endocrine  Monitor for need for ddAVP for DI; drink to thirst  Hyperglycemia: previously on insulin gtt, currently on RISS  Goal euglycemia (-180)    HEME/ONC:  VTE prophylaxis: [x] SCDs [x] chemoprophylaxis [] hold chemoprophylaxis due to: [] high risk of DVT/PE on admission due to:    ID: Afebrile    SOCIAL/FAMILY:  [x] awaiting [] updated at bedside [] family meeting    CODE STATUS:  [x] Full Code [] DNR [] DNI [] Palliative/Comfort Care    DISPOSITION:  [] ICU [] Stroke Unit [x] Floor [] EMU [] RCU [] PCU SUMMARY/PLAN: transphenoidal pituitary adenoma resection  post-operative day  6 c/b cerebrospinal fluid  leak status post lumbar drain --d/c'ed on 7/23    NEURO:  q4h neuro checks, pain control, MRI post-op, skull base precautions, am cortisol x3 days, lumbar drain  clamped on 7/22, d/c'ed on 7/23  Activity: [x] mobilize as tolerated [] Bedrest [x] PT [] OT [] PMNR    PULM:  room air , pituitary precautions with no nasal cannula, no nasal device, humidification of nostrils with face tent nebulizations, no incentive spirometry, noBiPAP, nausea prevention with ondansetron and metoclopramide prn and cough prevention with guaifenesin     CV:  systolic blood pressure  goal 100-150mmHg    RENAL:  Fluids: intravenous fluids , drink to thirst    GI:  Diet: consistent carbohydrate diet   GI prophylaxis [x] not indicated [] PPI [] other:  Bowel regimen [x] colace [x] senna [x] other: Miralax    ENDO:   Hydrocortisone taper per Endocrine  Monitor for need for desmopressin for DI; drink to thirst  Hyperglycemia: previously on insulin gtt, currently on reg insulin sliding scale   Goal euglycemia (-180)    HEME/ONC:  venous thromboembolism  prophylaxis: [x] SCDs [x] chemoprophylaxis [] hold chemoprophylaxis due to: [] high risk of DVT/PE on admission due to:    ID: Afebrile cefazolin for rosie-operative antibiotic prophylaxis     SOCIAL/FAMILY:  [x] awaiting [] updated at bedside [] family meeting    CODE STATUS:  [x] Full Code [] DNR [] DNI [] Palliative/Comfort Care    DISPOSITION:  [] ICU [] Stroke Unit [x] Floor [] EMU [] RCU [] PCU    35 minutes dedicated to this patient, who is not critically ill but still had a severe neurological injury or serious neurosurgical procedure, with high complexity management with multiple organ-system plan.

## 2017-07-24 NOTE — PHYSICAL THERAPY INITIAL EVALUATION ADULT - DISCHARGE DISPOSITION, PT EVAL
home w/ outpatient services/Pt states her daughter will be home to assist her as needed./home w/ assist home w/ assist/Pt states her daughter will be home to assist her as needed./outpatient PT

## 2017-07-24 NOTE — PROGRESS NOTE ADULT - SUBJECTIVE AND OBJECTIVE BOX
Chief Complaint: Evaluating this 82 yr F s/p pituitary adenoma resection for DI and adrenal insufficiency    History: Patient c/o mild headache. Urine output has been WNL-no longer has jenkins.    MEDICATIONS  (STANDING):  polyethylene glycol 3350 17 Gram(s) Oral daily  docusate sodium 100 milliGRAM(s) Oral three times a day  escitalopram 10 milliGRAM(s) Oral daily  pantoprazole  Injectable 40 milliGRAM(s) IV Push daily  enoxaparin Injectable 40 milliGRAM(s) SubCutaneous <User Schedule>  insulin lispro (HumaLOG) corrective regimen sliding scale   SubCutaneous three times a day before meals  dextrose 5%. 1000 milliLiter(s) (50 mL/Hr) IV Continuous <Continuous>  dextrose 50% Injectable 12.5 Gram(s) IV Push once  dextrose 50% Injectable 25 Gram(s) IV Push once  dextrose 50% Injectable 25 Gram(s) IV Push once  cyanocobalamin 100 MICROGram(s) Oral daily  folic acid 1 milliGRAM(s) Oral daily  multivitamin/minerals 1 Tablet(s) Oral daily  sodium chloride 0.65% Nasal 2 Spray(s) Both Nostrils four times a day    MEDICATIONS  (PRN):  senna 2 Tablet(s) Oral at bedtime PRN Constipation  acetaminophen   Tablet 650 milliGRAM(s) Oral every 6 hours PRN For Temp greater than 38 C (100.4 F)  acetaminophen   Tablet. 650 milliGRAM(s) Oral every 6 hours PRN Mild Pain (1 - 3)  ondansetron Injectable 4 milliGRAM(s) IV Push every 6 hours PRN Nausea and/or Vomiting  oxyCODONE    IR 5 milliGRAM(s) Oral every 8 hours PRN Moderate Pain (4 - 6)  oxyCODONE    IR 10 milliGRAM(s) Oral every 8 hours PRN Severe Pain (7 - 10)  dextrose Gel 1 Dose(s) Oral once PRN Blood Glucose LESS THAN 70 milliGRAM(s)/deciliter  glucagon  Injectable 1 milliGRAM(s) IntraMuscular once PRN Glucose LESS THAN 70 milligrams/deciliter      Allergies    No Known Allergies    Intolerances      Review of Systems:  Constitutional: No fever  Eyes: No blurry vision  Neuro: No tremors  HEENT: No pain  Cardiovascular: No chest pain, palpitations  Respiratory: No SOB, no cough  GI: No nausea, vomiting, abdominal pain  : No dysuria  Skin: no rash  Psych: no depression  Endocrine: no polyuria, polydipsia  Hem/lymph: no swelling  Osteoporosis: no fractures    ALL OTHER SYSTEMS REVIEWED AND NEGATIVE        PHYSICAL EXAM:  VITALS: T(C): 37.1 (07-24-17 @ 15:00)  T(F): 98.7 (07-24-17 @ 15:00), Max: 98.7 (07-24-17 @ 15:00)  HR: 57 (07-24-17 @ 15:00) (49 - 63)  BP: 118/62 (07-24-17 @ 15:00) (118/62 - 159/82)  RR:  (13 - 33)  SpO2:  (91% - 97%)  Wt(kg): --  GENERAL: NAD, well-groomed, well-developed  EYES: No proptosis, no lid lag, anicteric  HEENT:  Atraumatic, Normocephalic, moist mucous membranes  THYROID: Normal size, no palpable nodules  RESPIRATORY: Clear to auscultation bilaterally; No rales, rhonchi, wheezing, or rubs  CARDIOVASCULAR: Regular rate and rhythm; No murmurs; no peripheral edema  GI: Soft, nontender, non distended, normal bowel sounds  SKIN: Dry, intact, No rashes or lesions  MUSCULOSKELETAL: Full range of motion, normal strength  NEURO: sensation intact, extraocular movements intact, no tremor, normal reflexes  PSYCH: Alert and oriented x 3, normal affect, normal mood  CUSHING'S SIGNS: no striae    CAPILLARY BLOOD GLUCOSE  114 (07-24 @ 06:00)  81 (07-23 @ 18:00)  161 (07-23 @ 11:00)  112 (07-23 @ 06:00)  196 (07-22 @ 17:00)  137 (07-22 @ 11:00)  119 (07-21 @ 18:00)      07-24    142  |  105  |  10  ----------------------------<  182<H>  3.9   |  23  |  0.56    EGFR if : 101  EGFR if non : 87    Ca    8.5      07-24  Mg     2.2     07-24  Phos  3.5     07-24    TPro  5.9<L>  /  Alb  3.4  /  TBili  1.1  /  DBili  x   /  AST  18  /  ALT  19  /  AlkPhos  60  07-24          Thyroid Function Tests:  07-18 @ 20:08 TSH 1.92 FreeT4 -- T3 -- Anti TPO -- Anti Thyroglobulin Ab -- TSI --  07-01 @ 07:00 TSH -- FreeT4 1.06 T3 -- Anti TPO -- Anti Thyroglobulin Ab -- TSI --      Hemoglobin A1C, Whole Blood: 5.6 % [4.0 - 5.6] (07-21-17 @ 17:58) Chief Complaint: Evaluating this 82 yr F s/p pituitary adenoma resection for DI and adrenal insufficiency    History: Patient c/o mild headache. Urine output has been WNL-no longer has jenkins.  +abdominal pain without nausea. Had a bm today - no constipation.    MEDICATIONS  (STANDING):  polyethylene glycol 3350 17 Gram(s) Oral daily  docusate sodium 100 milliGRAM(s) Oral three times a day  escitalopram 10 milliGRAM(s) Oral daily  pantoprazole  Injectable 40 milliGRAM(s) IV Push daily  enoxaparin Injectable 40 milliGRAM(s) SubCutaneous <User Schedule>  insulin lispro (HumaLOG) corrective regimen sliding scale   SubCutaneous three times a day before meals  dextrose 5%. 1000 milliLiter(s) (50 mL/Hr) IV Continuous <Continuous>  dextrose 50% Injectable 12.5 Gram(s) IV Push once  dextrose 50% Injectable 25 Gram(s) IV Push once  dextrose 50% Injectable 25 Gram(s) IV Push once  cyanocobalamin 100 MICROGram(s) Oral daily  folic acid 1 milliGRAM(s) Oral daily  multivitamin/minerals 1 Tablet(s) Oral daily  sodium chloride 0.65% Nasal 2 Spray(s) Both Nostrils four times a day    MEDICATIONS  (PRN):  senna 2 Tablet(s) Oral at bedtime PRN Constipation  acetaminophen   Tablet 650 milliGRAM(s) Oral every 6 hours PRN For Temp greater than 38 C (100.4 F)  acetaminophen   Tablet. 650 milliGRAM(s) Oral every 6 hours PRN Mild Pain (1 - 3)  ondansetron Injectable 4 milliGRAM(s) IV Push every 6 hours PRN Nausea and/or Vomiting  oxyCODONE    IR 5 milliGRAM(s) Oral every 8 hours PRN Moderate Pain (4 - 6)  oxyCODONE    IR 10 milliGRAM(s) Oral every 8 hours PRN Severe Pain (7 - 10)  dextrose Gel 1 Dose(s) Oral once PRN Blood Glucose LESS THAN 70 milliGRAM(s)/deciliter  glucagon  Injectable 1 milliGRAM(s) IntraMuscular once PRN Glucose LESS THAN 70 milligrams/deciliter      Allergies    No Known Allergies    Intolerances      Review of Systems:  Constitutional: No fever  Eyes: No blurry vision  Neuro: No tremors  HEENT: No pain  Cardiovascular: No chest pain, palpitations  Respiratory: No SOB, no cough  GI: No nausea, vomiting, abdominal pain  : No dysuria  Skin: no rash  Psych: no depression  Endocrine: no polyuria, polydipsia  Hem/lymph: no swelling  Osteoporosis: no fractures    ALL OTHER SYSTEMS REVIEWED AND NEGATIVE        PHYSICAL EXAM:  VITALS: T(C): 37.1 (07-24-17 @ 15:00)  T(F): 98.7 (07-24-17 @ 15:00), Max: 98.7 (07-24-17 @ 15:00)  HR: 57 (07-24-17 @ 15:00) (49 - 63)  BP: 118/62 (07-24-17 @ 15:00) (118/62 - 159/82)  RR:  (13 - 33)  SpO2:  (91% - 97%)  Wt(kg): --  GENERAL: NAD, well-groomed, well-developed  EYES: No proptosis, no lid lag, anicteric  HEENT:  Atraumatic, Normocephalic, moist mucous membranes  THYROID: Normal size, no palpable nodules  RESPIRATORY: Clear to auscultation bilaterally; No rales, rhonchi, wheezing, or rubs  CARDIOVASCULAR: Regular rate and rhythm; No murmurs; no peripheral edema  GI: Soft, nontender, non distended, normal bowel sounds  SKIN: Dry, intact, No rashes or lesions  MUSCULOSKELETAL: Full range of motion, normal strength  NEURO: sensation intact, extraocular movements intact, no tremor, normal reflexes  PSYCH: Alert and oriented x 3, normal affect, normal mood  CUSHING'S SIGNS: no striae    CAPILLARY BLOOD GLUCOSE  114 (07-24 @ 06:00)  81 (07-23 @ 18:00)  161 (07-23 @ 11:00)  112 (07-23 @ 06:00)  196 (07-22 @ 17:00)  137 (07-22 @ 11:00)  119 (07-21 @ 18:00)      07-24    142  |  105  |  10  ----------------------------<  182<H>  3.9   |  23  |  0.56    EGFR if : 101  EGFR if non : 87    Ca    8.5      07-24  Mg     2.2     07-24  Phos  3.5     07-24    TPro  5.9<L>  /  Alb  3.4  /  TBili  1.1  /  DBili  x   /  AST  18  /  ALT  19  /  AlkPhos  60  07-24          Thyroid Function Tests:  07-18 @ 20:08 TSH 1.92 FreeT4 -- T3 -- Anti TPO -- Anti Thyroglobulin Ab -- TSI --  07-01 @ 07:00 TSH -- FreeT4 1.06 T3 -- Anti TPO -- Anti Thyroglobulin Ab -- TSI --      Hemoglobin A1C, Whole Blood: 5.6 % [4.0 - 5.6] (07-21-17 @ 17:58)

## 2017-07-24 NOTE — PROGRESS NOTE ADULT - PROBLEM SELECTOR PLAN 2
AM cortisol (on dex) was 18 which is sufficient.  AM cortisol off Dex was 2.3. Will hold evening dose hydrocortisone today. Check AM cortisol on 7/25. AM cortisol (on dex) was 18 which is sufficient.  AM cortisol off Dex was 2.3. Will hold evening dose hydrocortisone today. Check AM cortisol on 7/25.  -Check FT4 in am.

## 2017-07-24 NOTE — PHYSICAL THERAPY INITIAL EVALUATION ADULT - PRECAUTIONS/LIMITATIONS, REHAB EVAL
Pt s/p Transsphenoidal resection of pituitary tumor with MRI guidance (7/20/17). CSF leak in OR, rec'd Lumbar Drain Placement (7/20/17). Lumbar Drain removed 7/23/17. PMH: TIA, Depression

## 2017-07-24 NOTE — PROGRESS NOTE ADULT - ASSESSMENT
82yoF s/p TSRP mass with intra-op CSF leak repair s/p nasal packing removal and LD removal yesterday, dong well

## 2017-07-24 NOTE — PHYSICAL THERAPY INITIAL EVALUATION ADULT - TRANSFER SAFETY CONCERNS NOTED: SIT/STAND, REHAB EVAL
decreased balance during turns/decreased sequencing ability/decreased weight-shifting ability/decreased step length

## 2017-07-24 NOTE — PROGRESS NOTE ADULT - PROBLEM SELECTOR PLAN 1
Start Nasal Saline B/L 3x/day  Monitor for further CSF leak  HOB elevation, avoid nasal trauma/straining cough and sneeze with mouth open  above discussed with patient in detail verbalized understanding  NeuroSx Care.

## 2017-07-24 NOTE — PROGRESS NOTE ADULT - SUBJECTIVE AND OBJECTIVE BOX
SUMMARY:   82F history of TIA and previous partial pituitary adenoma resection in 1997 s/p TSP for pituitary tumor and LD on 07/20/2017, complicated by intra-op CSF leak and diabetes insipidus. Lumbar drain clamped on 7/22 and d/c'd 7/23.    No overnight events    ROS: mild headache  VITALS:   T(C): 36.9 (07-24-17 @ 03:00), Max: 37 (07-23-17 @ 11:00)  HR: 53 (07-24-17 @ 07:00) (50 - 62)  BP: 151/73 (07-24-17 @ 07:00) (118/58 - 165/86)  RR: 16 (07-24-17 @ 07:00) (12 - 32)  SpO2: 96% (07-24-17 @ 07:00) (91% - 96%)    07-23-17 @ 07:01  -  07-24-17 @ 07:00  --------------------------------------------------------  IN: 1125 mL / OUT: 1200 mL / NET: -75 mL    LABS:                       13.4   7.1   )-----------( 123      ( 24 Jul 2017 01:39 )             37.4     07-24    142  |  105  |  10  ----------------------------<  182<H>  3.9   |  23  |  0.56    Ca    8.5      24 Jul 2017 01:39  Phos  3.5     07-24  Mg     2.2     07-24    TPro  5.9<L>  /  Alb  3.4  /  TBili  1.1  /  DBili  x   /  AST  18  /  ALT  19  /  AlkPhos  60  07-24    MEDS:  MEDICATIONS  (STANDING):  polyethylene glycol 3350 17 Gram(s) Oral daily  docusate sodium 100 milliGRAM(s) Oral three times a day  escitalopram 10 milliGRAM(s) Oral daily  pantoprazole  Injectable 40 milliGRAM(s) IV Push daily  enoxaparin Injectable 40 milliGRAM(s) SubCutaneous <User Schedule>  insulin lispro (HumaLOG) corrective regimen sliding scale   SubCutaneous three times a day before meals  dextrose 5%. 1000 milliLiter(s) (50 mL/Hr) IV Continuous <Continuous>  dextrose 50% Injectable 12.5 Gram(s) IV Push once  dextrose 50% Injectable 25 Gram(s) IV Push once  dextrose 50% Injectable 25 Gram(s) IV Push once  cyanocobalamin 100 MICROGram(s) Oral daily  folic acid 1 milliGRAM(s) Oral daily  multivitamin/minerals 1 Tablet(s) Oral daily      EXAMINATION:  Awake, alert, fully oriented, follows, PERRL, VFFtc, EOMI, face symmetric, moves all limbs with full strength, lungs clear, heart regular but bradycardic, abdomen soft, no c/c/e SUMMARY:   82yoF history of TIA and previous partial pituitary adenoma resection in 1997 s/p TSP for pituitary tumor and LD on 07/20/2017, complicated by intra-op CSF leak and diabetes insipidus. Lumbar drain clamped on 7/22 and d/c'd 7/23.    No overnight events    ROS: mild headache  VITALS:   T(C): 36.9 (07-24-17 @ 03:00), Max: 37 (07-23-17 @ 11:00)  HR: 53 (07-24-17 @ 07:00) (50 - 62)  BP: 151/73 (07-24-17 @ 07:00) (118/58 - 165/86)  RR: 16 (07-24-17 @ 07:00) (12 - 32)  SpO2: 96% (07-24-17 @ 07:00) (91% - 96%)    07-23-17 @ 07:01  -  07-24-17 @ 07:00  --------------------------------------------------------  IN: 1125 mL / OUT: 1200 mL / NET: -75 mL    LABS:                       13.4   7.1   )-----------( 123      ( 24 Jul 2017 01:39 )             37.4     07-24    142  |  105  |  10  ----------------------------<  182<H>  3.9   |  23  |  0.56    Ca    8.5      24 Jul 2017 01:39  Phos  3.5     07-24  Mg     2.2     07-24    TPro  5.9<L>  /  Alb  3.4  /  TBili  1.1  /  DBili  x   /  AST  18  /  ALT  19  /  AlkPhos  60  07-24    MEDS:  MEDICATIONS  (STANDING):  polyethylene glycol 3350 17 Gram(s) Oral daily  docusate sodium 100 milliGRAM(s) Oral three times a day  escitalopram 10 milliGRAM(s) Oral daily  pantoprazole  Injectable 40 milliGRAM(s) IV Push daily  enoxaparin Injectable 40 milliGRAM(s) SubCutaneous <User Schedule>  insulin lispro (HumaLOG) corrective regimen sliding scale   SubCutaneous three times a day before meals  dextrose 5%. 1000 milliLiter(s) (50 mL/Hr) IV Continuous <Continuous>  dextrose 50% Injectable 12.5 Gram(s) IV Push once  dextrose 50% Injectable 25 Gram(s) IV Push once  dextrose 50% Injectable 25 Gram(s) IV Push once  cyanocobalamin 100 MICROGram(s) Oral daily  folic acid 1 milliGRAM(s) Oral daily  multivitamin/minerals 1 Tablet(s) Oral daily      EXAMINATION:  Alert, attentive, fully oriented, obeys commands, fluent, cranial nerves intact, no facial asymmetry, pupils equal briskly reactive, muscle strength 5/5 in all extremities, no sensory deficit, no dysmetria, lungs clear, heart regular but bradycardic, abdomen soft, no edema

## 2017-07-24 NOTE — PHYSICAL THERAPY INITIAL EVALUATION ADULT - PERTINENT HX OF CURRENT PROBLEM, REHAB EVAL
82 year old Tajik speaking female s/p Transsphenoidal resection of pituitary tumor with MRI guidance and Lumbar Drain Placement (7/20/17). Lumbar Drain discharged 7/23. Pt had history of pituitary tumor s/p resection 20 years ago, recurred 6 months ago, [place]. Manrique ([facility] p/w progressively worsening vision loss and weakness over the last few weeks. Was told by NSG 3 weeks ago to return to ER when symptoms worsened. 82 year old Bengali speaking female history of pituitary tumor s/p resection 20 years ago, recurred 6 months ago, followed by Dr. Manrique ([facility]) p/w progressively worsening vision loss and weakness over the last few weeks. Was told by [facility] 3 weeks ago to return to ER when symptoms worsened. Pt states she can only see shadows now has no true visual acuity.

## 2017-07-24 NOTE — PROGRESS NOTE ADULT - SUBJECTIVE AND OBJECTIVE BOX
POD/STATUS POST/ENT ISSUE: s/p TSRP mass with intra-op csf leak repair    INTERVAL HPI: pt s/p Nasal Packing removal yesterday and Lumbar Drain removed yesterday, pt denies any nasal drainage or epistaxis/HA/vertigo/hemoptysis. Pt states she feels like her Right nostril is clogged    Vital Signs Last 24 Hrs  T(C): 37 (24 Jul 2017 07:00), Max: 37 (23 Jul 2017 11:00)  T(F): 98.6 (24 Jul 2017 07:00), Max: 98.6 (23 Jul 2017 11:00)  HR: 60 (24 Jul 2017 10:00) (49 - 62)  BP: 131/73 (24 Jul 2017 10:00) (118/58 - 165/86)  BP(mean): 96 (24 Jul 2017 10:00) (84 - 117)  RR: 26 (24 Jul 2017 10:00) (12 - 32)  SpO2: 91% (24 Jul 2017 10:00) (91% - 97%)    PHYSICAL EXAM:  Gen: NAD, well-developed A&O to baseline NAD  Head: Normocephalic, Atraumatic  Eyes: PERRL, EOMI, no scleral injection  Nose: Nares bilaterally patent, with dry blood in nares, no active bleeding or oozing. No obvious CSF leak with maneuvers  Mouth: Mucosa moist, tongue/uvula midline, oropharynx clear no blood in posterior OP  Neck: Flat, supple, no lymphadenopathy, trachea midline, no masses  Resp: breathing easily, no stridor      LABS:                        13.4   7.1   )-----------( 123      ( 24 Jul 2017 01:39 )             37.4

## 2017-07-25 ENCOUNTER — TRANSCRIPTION ENCOUNTER (OUTPATIENT)
Age: 82
End: 2017-07-25

## 2017-07-25 LAB
ANION GAP SERPL CALC-SCNC: 14 MMOL/L — SIGNIFICANT CHANGE UP (ref 5–17)
BUN SERPL-MCNC: 10 MG/DL — SIGNIFICANT CHANGE UP (ref 7–23)
CALCIUM SERPL-MCNC: 8.3 MG/DL — LOW (ref 8.4–10.5)
CHLORIDE SERPL-SCNC: 104 MMOL/L — SIGNIFICANT CHANGE UP (ref 96–108)
CO2 SERPL-SCNC: 23 MMOL/L — SIGNIFICANT CHANGE UP (ref 22–31)
CORTIS AM PEAK SERPL-MCNC: 4.7 UG/DL — LOW (ref 6–18.4)
CREAT SERPL-MCNC: 0.64 MG/DL — SIGNIFICANT CHANGE UP (ref 0.5–1.3)
GLUCOSE SERPL-MCNC: 120 MG/DL — HIGH (ref 70–99)
POTASSIUM SERPL-MCNC: 3.9 MMOL/L — SIGNIFICANT CHANGE UP (ref 3.5–5.3)
POTASSIUM SERPL-SCNC: 3.9 MMOL/L — SIGNIFICANT CHANGE UP (ref 3.5–5.3)
SODIUM SERPL-SCNC: 141 MMOL/L — SIGNIFICANT CHANGE UP (ref 135–145)
T3 SERPL-MCNC: 54 NG/DL — LOW (ref 80–200)
T4 AB SER-ACNC: 5.2 UG/DL — SIGNIFICANT CHANGE UP (ref 4.6–12)
T4 FREE SERPL-MCNC: 1.1 NG/DL — SIGNIFICANT CHANGE UP (ref 0.9–1.8)
TSH SERPL-MCNC: 1 UIU/ML — SIGNIFICANT CHANGE UP (ref 0.27–4.2)

## 2017-07-25 PROCEDURE — 99024 POSTOP FOLLOW-UP VISIT: CPT

## 2017-07-25 PROCEDURE — 99232 SBSQ HOSP IP/OBS MODERATE 35: CPT | Mod: GC

## 2017-07-25 PROCEDURE — 99232 SBSQ HOSP IP/OBS MODERATE 35: CPT

## 2017-07-25 RX ORDER — OXYCODONE HYDROCHLORIDE 5 MG/1
1 TABLET ORAL
Qty: 42 | Refills: 0 | OUTPATIENT
Start: 2017-07-25 | End: 2017-08-01

## 2017-07-25 RX ORDER — OXYCODONE HYDROCHLORIDE 5 MG/1
5 TABLET ORAL EVERY 4 HOURS
Qty: 0 | Refills: 0 | Status: DISCONTINUED | OUTPATIENT
Start: 2017-07-25 | End: 2017-07-26

## 2017-07-25 RX ORDER — OXYCODONE HYDROCHLORIDE 5 MG/1
10 TABLET ORAL EVERY 6 HOURS
Qty: 0 | Refills: 0 | Status: DISCONTINUED | OUTPATIENT
Start: 2017-07-25 | End: 2017-07-26

## 2017-07-25 RX ORDER — ACETAMINOPHEN 500 MG
2 TABLET ORAL
Qty: 0 | Refills: 0 | COMMUNITY
Start: 2017-07-25

## 2017-07-25 RX ORDER — POLYETHYLENE GLYCOL 3350 17 G/17G
17 POWDER, FOR SOLUTION ORAL DAILY
Qty: 0 | Refills: 0 | Status: DISCONTINUED | OUTPATIENT
Start: 2017-07-26 | End: 2017-07-26

## 2017-07-25 RX ORDER — FOLIC ACID 0.8 MG
1 TABLET ORAL
Qty: 0 | Refills: 0 | COMMUNITY
Start: 2017-07-25

## 2017-07-25 RX ORDER — LACTULOSE 10 G/15ML
10 SOLUTION ORAL EVERY 6 HOURS
Qty: 0 | Refills: 0 | Status: COMPLETED | OUTPATIENT
Start: 2017-07-25 | End: 2017-07-26

## 2017-07-25 RX ORDER — HYDROCORTISONE 20 MG
10 TABLET ORAL
Qty: 0 | Refills: 0 | Status: DISCONTINUED | OUTPATIENT
Start: 2017-07-25 | End: 2017-07-25

## 2017-07-25 RX ORDER — POLYETHYLENE GLYCOL 3350 17 G/17G
34 POWDER, FOR SOLUTION ORAL ONCE
Qty: 0 | Refills: 0 | Status: COMPLETED | OUTPATIENT
Start: 2017-07-25 | End: 2017-07-25

## 2017-07-25 RX ORDER — HYDROCORTISONE 20 MG
10 TABLET ORAL
Qty: 0 | Refills: 0 | Status: DISCONTINUED | OUTPATIENT
Start: 2017-07-25 | End: 2017-07-26

## 2017-07-25 RX ORDER — PREGABALIN 225 MG/1
1 CAPSULE ORAL
Qty: 0 | Refills: 0 | COMMUNITY
Start: 2017-07-25

## 2017-07-25 RX ORDER — MULTIVIT-MIN/FERROUS GLUCONATE 9 MG/15 ML
1 LIQUID (ML) ORAL
Qty: 0 | Refills: 0 | COMMUNITY
Start: 2017-07-25

## 2017-07-25 RX ORDER — HYDROCORTISONE 20 MG
5 TABLET ORAL
Qty: 0 | Refills: 0 | Status: DISCONTINUED | OUTPATIENT
Start: 2017-07-25 | End: 2017-07-26

## 2017-07-25 RX ADMIN — PREGABALIN 100 MICROGRAM(S): 225 CAPSULE ORAL at 11:05

## 2017-07-25 RX ADMIN — Medication 2 SPRAY(S): at 11:07

## 2017-07-25 RX ADMIN — OXYCODONE HYDROCHLORIDE 5 MILLIGRAM(S): 5 TABLET ORAL at 05:33

## 2017-07-25 RX ADMIN — OXYCODONE HYDROCHLORIDE 10 MILLIGRAM(S): 5 TABLET ORAL at 11:05

## 2017-07-25 RX ADMIN — OXYCODONE HYDROCHLORIDE 5 MILLIGRAM(S): 5 TABLET ORAL at 22:30

## 2017-07-25 RX ADMIN — OXYCODONE HYDROCHLORIDE 5 MILLIGRAM(S): 5 TABLET ORAL at 06:00

## 2017-07-25 RX ADMIN — Medication 2 SPRAY(S): at 17:41

## 2017-07-25 RX ADMIN — OXYCODONE HYDROCHLORIDE 10 MILLIGRAM(S): 5 TABLET ORAL at 23:54

## 2017-07-25 RX ADMIN — Medication 2 SPRAY(S): at 23:53

## 2017-07-25 RX ADMIN — Medication 10 MILLIGRAM(S): at 16:21

## 2017-07-25 RX ADMIN — Medication 20 MILLIGRAM(S): at 11:05

## 2017-07-25 RX ADMIN — ESCITALOPRAM OXALATE 10 MILLIGRAM(S): 10 TABLET, FILM COATED ORAL at 11:05

## 2017-07-25 RX ADMIN — POLYETHYLENE GLYCOL 3350 34 GRAM(S): 17 POWDER, FOR SOLUTION ORAL at 11:06

## 2017-07-25 RX ADMIN — Medication 1 MILLIGRAM(S): at 11:05

## 2017-07-25 RX ADMIN — Medication 2 SPRAY(S): at 05:10

## 2017-07-25 RX ADMIN — Medication 2 SPRAY(S): at 01:42

## 2017-07-25 RX ADMIN — Medication 1 TABLET(S): at 11:05

## 2017-07-25 RX ADMIN — OXYCODONE HYDROCHLORIDE 10 MILLIGRAM(S): 5 TABLET ORAL at 11:35

## 2017-07-25 RX ADMIN — ENOXAPARIN SODIUM 40 MILLIGRAM(S): 100 INJECTION SUBCUTANEOUS at 17:39

## 2017-07-25 RX ADMIN — Medication 100 MILLIGRAM(S): at 11:06

## 2017-07-25 RX ADMIN — OXYCODONE HYDROCHLORIDE 5 MILLIGRAM(S): 5 TABLET ORAL at 21:52

## 2017-07-25 RX ADMIN — LACTULOSE 10 GRAM(S): 10 SOLUTION ORAL at 11:09

## 2017-07-25 RX ADMIN — Medication 4: at 17:39

## 2017-07-25 RX ADMIN — Medication 100 MILLIGRAM(S): at 05:09

## 2017-07-25 NOTE — DIETITIAN INITIAL EVALUATION ADULT. - OTHER INFO
Pt seen for length of stay. Pt states she weighed 65kg 6 months ago but has not weight herself since. Admission Wt on 7/18 was 67kg, most recent Wt 68.6kg on 7/23. Pt denies Wt gain since admission as her appetite and PO intake has been decreased. Pt states she eats ~50-70% at meals. Denies any swallowing difficulty but some meats are hard to chew with dentures. Denies GI distress. C/o headache-RN made aware.

## 2017-07-25 NOTE — PROGRESS NOTE ADULT - ATTENDING COMMENTS
I have interviewed and examined the patient and reviewed the residents note including the history, exam, assessment, and plan.  I agree with the residents assessment and plan.    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 Vision and color significantly improved, fields now full.     Follow up with neuro-ophthalmology within 1 week of discharge: Dr Gutierrez, [phone]    Verónica Will MD

## 2017-07-25 NOTE — PROGRESS NOTE ADULT - ASSESSMENT
HPI:  p ([id number])     HPI:Mandi Layo Bulgarian speaking 82 year old female s/p tsp for pituitary tumor 20 years ago. History of TIA several months ago, takes ASA 81 daily.  Presents with worsening vision.  Baseline was blurry vision but claims she can now only see shadows in the past 2 weeks in both eyes. No acute HA. History provided by grandson and daughter.    81 yo female s/p transphenoidal resection of pituitary tumor.   1 , out of bed   2 continue pt   3 dvt ppx sql /scds   4 prn pain meds   5 hydrocortisone once a day   6 f/u endo today for discharge clearance   7 f/u am labs today   8 continue lexapro  9 continue ocean nasal spray   10 laxatives   11 possible dc home today

## 2017-07-25 NOTE — PROGRESS NOTE ADULT - ASSESSMENT
83 y/o F, s/p TSRP mass with intra-op csf leak repair POD #5, lumbar drain and nasal packing d/c'd 7/23, no signs of CSF leak on physical exam, patient improving clinically.

## 2017-07-25 NOTE — PROGRESS NOTE ADULT - SUBJECTIVE AND OBJECTIVE BOX
Chief Complaint: Evaluating this 82 yr F s/p pituitary adenoma resection for DI and adrenal insufficiency    History: C/o mild headache. Denies increased thirst or urination. Denies abdominal pain.     MEDICATIONS  (STANDING):  docusate sodium 100 milliGRAM(s) Oral three times a day  escitalopram 10 milliGRAM(s) Oral daily  enoxaparin Injectable 40 milliGRAM(s) SubCutaneous <User Schedule>  insulin lispro (HumaLOG) corrective regimen sliding scale   SubCutaneous three times a day before meals  dextrose 5%. 1000 milliLiter(s) (50 mL/Hr) IV Continuous <Continuous>  dextrose 50% Injectable 12.5 Gram(s) IV Push once  dextrose 50% Injectable 25 Gram(s) IV Push once  dextrose 50% Injectable 25 Gram(s) IV Push once  cyanocobalamin 100 MICROGram(s) Oral daily  folic acid 1 milliGRAM(s) Oral daily  multivitamin/minerals 1 Tablet(s) Oral daily  sodium chloride 0.65% Nasal 2 Spray(s) Both Nostrils four times a day  hydrocortisone 20 milliGRAM(s) Oral <User Schedule>  lactulose Syrup 10 Gram(s) Oral every 6 hours  hydrocortisone 10 milliGRAM(s) Oral <User Schedule>    MEDICATIONS  (PRN):  senna 2 Tablet(s) Oral at bedtime PRN Constipation  acetaminophen   Tablet 650 milliGRAM(s) Oral every 6 hours PRN For Temp greater than 38 C (100.4 F)  acetaminophen   Tablet. 650 milliGRAM(s) Oral every 6 hours PRN Mild Pain (1 - 3)  ondansetron Injectable 4 milliGRAM(s) IV Push every 6 hours PRN Nausea and/or Vomiting  dextrose Gel 1 Dose(s) Oral once PRN Blood Glucose LESS THAN 70 milliGRAM(s)/deciliter  glucagon  Injectable 1 milliGRAM(s) IntraMuscular once PRN Glucose LESS THAN 70 milligrams/deciliter  oxyCODONE    IR 10 milliGRAM(s) Oral every 6 hours PRN Severe Pain (7 - 10)  oxyCODONE    IR 5 milliGRAM(s) Oral every 4 hours PRN Moderate Pain (4 - 6)      PHYSICAL EXAM:  VITALS: T(C): 37 (07-25-17 @ 12:10)  T(F): 98.6 (07-25-17 @ 12:10), Max: 98.7 (07-24-17 @ 15:00)  HR: 58 (07-25-17 @ 12:10) (54 - 64)  BP: 146/84 (07-25-17 @ 12:10) (112/63 - 150/80)  RR:  (16 - 20)  SpO2:  (92% - 99%)  Wt(kg): --  GENERAL: NAD, well-groomed, well-developed  EYES: No proptosis  HEENT:  Atraumatic, Normocephalic, moist mucous membranes  RESPIRATORY: Clear to auscultation bilaterally; No rales, rhonchi, wheezing, or rubs  CARDIOVASCULAR: Regular rate and rhythm; No murmurs; no peripheral edema  GI: Soft, nontender, non distended, normal bowel sounds  SKIN: Dry, intact, No rashes or lesions        CAPILLARY BLOOD GLUCOSE  139 (07-25 @ 13:02)  113 (07-25 @ 08:43)  138 (07-24 @ 18:00)  114 (07-24 @ 06:00)  81 (07-23 @ 18:00)  161 (07-23 @ 11:00)  112 (07-23 @ 06:00)  196 (07-22 @ 17:00)      07-25    141  |  104  |  10  ----------------------------<  120<H>  3.9   |  23  |  0.64    EGFR if : 96  EGFR if non : 83    Ca    8.3<L>      07-25  Mg     2.2     07-24  Phos  3.5     07-24    TPro  5.9<L>  /  Alb  3.4  /  TBili  1.1  /  DBili  x   /  AST  18  /  ALT  19  /  AlkPhos  60  07-24          Thyroid Function Tests:  07-18 @ 20:08 TSH 1.92 FreeT4 -- T3 -- Anti TPO -- Anti Thyroglobulin Ab -- TSI --  07-01 @ 07:00 TSH -- FreeT4 1.06 T3 -- Anti TPO -- Anti Thyroglobulin Ab -- TSI --      Hemoglobin A1C, Whole Blood: 5.6 % [4.0 - 5.6] (07-21-17 @ 17:58) Chief Complaint: Evaluating this 82 yr F s/p pituitary adenoma resection for DI and adrenal insufficiency    History: C/o mild headache. Denies increased thirst or urination. Denies abdominal pain.     MEDICATIONS  (STANDING):  docusate sodium 100 milliGRAM(s) Oral three times a day  escitalopram 10 milliGRAM(s) Oral daily  enoxaparin Injectable 40 milliGRAM(s) SubCutaneous <User Schedule>  insulin lispro (HumaLOG) corrective regimen sliding scale   SubCutaneous three times a day before meals  dextrose 5%. 1000 milliLiter(s) (50 mL/Hr) IV Continuous <Continuous>  dextrose 50% Injectable 12.5 Gram(s) IV Push once  dextrose 50% Injectable 25 Gram(s) IV Push once  dextrose 50% Injectable 25 Gram(s) IV Push once  cyanocobalamin 100 MICROGram(s) Oral daily  folic acid 1 milliGRAM(s) Oral daily  multivitamin/minerals 1 Tablet(s) Oral daily  sodium chloride 0.65% Nasal 2 Spray(s) Both Nostrils four times a day  hydrocortisone 20 milliGRAM(s) Oral <User Schedule>  lactulose Syrup 10 Gram(s) Oral every 6 hours  hydrocortisone 10 milliGRAM(s) Oral <User Schedule>    MEDICATIONS  (PRN):  senna 2 Tablet(s) Oral at bedtime PRN Constipation  acetaminophen   Tablet 650 milliGRAM(s) Oral every 6 hours PRN For Temp greater than 38 C (100.4 F)  acetaminophen   Tablet. 650 milliGRAM(s) Oral every 6 hours PRN Mild Pain (1 - 3)  ondansetron Injectable 4 milliGRAM(s) IV Push every 6 hours PRN Nausea and/or Vomiting  dextrose Gel 1 Dose(s) Oral once PRN Blood Glucose LESS THAN 70 milliGRAM(s)/deciliter  glucagon  Injectable 1 milliGRAM(s) IntraMuscular once PRN Glucose LESS THAN 70 milligrams/deciliter  oxyCODONE    IR 10 milliGRAM(s) Oral every 6 hours PRN Severe Pain (7 - 10)  oxyCODONE    IR 5 milliGRAM(s) Oral every 4 hours PRN Moderate Pain (4 - 6)      PHYSICAL EXAM:  VITALS: T(C): 37 (07-25-17 @ 12:10)  T(F): 98.6 (07-25-17 @ 12:10), Max: 98.7 (07-24-17 @ 15:00)  HR: 58 (07-25-17 @ 12:10) (54 - 64)  BP: 146/84 (07-25-17 @ 12:10) (112/63 - 150/80)  RR:  (16 - 20)  SpO2:  (92% - 99%)  Wt(kg): --  GENERAL: NAD, well-groomed, well-developed  HEENT:  Atraumatic, Normocephalic, moist mucous membranes  RESPIRATORY: Clear to auscultation bilaterally; No rales, rhonchi, wheezing, or rubs  CARDIOVASCULAR: Regular rate and rhythm; No murmurs; no peripheral edema  GI: Soft, nontender, non distended, normal bowel sounds          CAPILLARY BLOOD GLUCOSE  139 (07-25 @ 13:02)  113 (07-25 @ 08:43)  138 (07-24 @ 18:00)  114 (07-24 @ 06:00)  81 (07-23 @ 18:00)  161 (07-23 @ 11:00)  112 (07-23 @ 06:00)  196 (07-22 @ 17:00)      07-25    141  |  104  |  10  ----------------------------<  120<H>  3.9   |  23  |  0.64    EGFR if : 96  EGFR if non : 83    Ca    8.3<L>      07-25  Mg     2.2     07-24  Phos  3.5     07-24    TPro  5.9<L>  /  Alb  3.4  /  TBili  1.1  /  DBili  x   /  AST  18  /  ALT  19  /  AlkPhos  60  07-24          Thyroid Function Tests:  07-18 @ 20:08 TSH 1.92 FreeT4 -- T3 -- Anti TPO -- Anti Thyroglobulin Ab -- TSI --  07-01 @ 07:00 TSH -- FreeT4 1.06 T3 -- Anti TPO -- Anti Thyroglobulin Ab -- TSI --      Hemoglobin A1C, Whole Blood: 5.6 % [4.0 - 5.6] (07-21-17 @ 17:58)

## 2017-07-25 NOTE — DISCHARGE NOTE ADULT - CARE PLAN
Principal Discharge DX:	Pituitary mass  Goal:	increase activity  Instructions for follow-up, activity and diet:	Follow up with Dr. Manrique in 7-10 days -Neuro surgeon please call the office to confirm appointment   Follow up with Dr. Maldonado ENT attending in one week -Please call office to confirm appointment.   Follow up with PMD IN 10 days  Follow up with endocrinologist  Secondary Diagnosis:	Depression Principal Discharge DX:	Pituitary mass  Goal:	increase activity  Instructions for follow-up, activity and diet:	Follow up with Dr. Manrique in 7-10 days -Neuro surgeon please call the office to confirm appointment   Follow up with Dr. Maldonado ENT attending in one week -Please call office to confirm appointment.   Follow up with neuro opthalmology in 7-10 days-  Dr Gutierrez, [phone] please call to conform appointment.     Follow up with PMD IN 10 days  Follow up with endocrinologist  Secondary Diagnosis:	Depression Principal Discharge DX:	Pituitary mass  Goal:	increase activity  Instructions for follow-up, activity and diet:	Follow up with Dr. Manrique in 7-10 days -Neuro surgeon please call the office to confirm appointment   Follow up with Dr. Maldonado ENT attending in one week -Please call office to confirm appointment.   Follow up with neuro opthalmology in 7-10 days-  Dr Gutierrez, [phone] please call to conform appointment.     Follow up with PMD IN 10 days  Follow up with endocrinologist  Secondary Diagnosis:	Depression Principal Discharge DX:	Pituitary mass  Goal:	increase activity  Instructions for follow-up, activity and diet:	Follow up with Dr. Manrique in 7-10 days -Neuro surgeon please call the office to confirm appointment   Follow up with Dr. Maldonado ENT attending in one week -Please call office to confirm appointment.   Follow up with neuro opthalmology in 7-10 days-  Dr Gutierrez, [phone] please call to conform appointment.     Follow up with PMD IN 10 days  Follow up with endocrinologist  Secondary Diagnosis:	Depression Principal Discharge DX:	Pituitary mass  Goal:	increase activity  Instructions for follow-up, activity and diet:	Follow up with Dr. Manrique in 7-10 days -Neuro surgeon please call the office to confirm appointment   Follow up with Dr. Maldonado ENT attending in one week -Please call office to confirm appointment.   Follow up with neuro opthalmology in 7-10 days-  Dr Gutierrez, [phone] please call to conform appointment.     Follow up with PMD IN 10 days  Follow up with endocrinologist  Secondary Diagnosis:	Depression Principal Discharge DX:	Pituitary mass  Goal:	increase activity  Instructions for follow-up, activity and diet:	Follow up with Dr. Manrique in 7-10 days -Neuro surgeon please call the office to confirm appointment   Follow up with Dr. Maldonado ENT attending in one week -Please call office to confirm appointment.   Follow up with neuro opthalmology in 7-10 days-  Dr Gutierrez, [phone] please call to conform appointment.     Follow up with PMD IN 10 days  Follow up with endocrinologist  Secondary Diagnosis:	Depression

## 2017-07-25 NOTE — PROGRESS NOTE ADULT - ASSESSMENT
A: 83 yo F with h/o sella mass s/p excision 25 years s/p excision of large sellar mass with compression of chiasm. Vision and color significantly improved, fields now full.     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 Vision and color significantly improved, fields now full.     P: Follow up with neuro-ophthalmology within 1 week of discharge: Dr Gutierrez, [phone]    D/W Dr Will (attending)

## 2017-07-25 NOTE — PROGRESS NOTE ADULT - SUBJECTIVE AND OBJECTIVE BOX
POD/STATUS POST/ENT ISSUE: s/p TSRP mass with intra-op csf leak repair POD #5    INTERVAL HPI: Patient examined at bedside this morning, no acute events overnight, VSS, laying in bed, neurosurgery team also at bedside with  on phone. Nasal packing and lumbar drain d/c'd on 7/23. Patient reports she has intermittent b/l nasal discharge of blood tinged mucus, states its never an active bleed. Patient denies otorrhea, clear nasal discharge, headaches, salty taste, visual disturbances, CP/SOB/N/V/D.     Vital Signs Last 24 Hrs  T(C): 36.8 (25 Jul 2017 08:20), Max: 37.1 (24 Jul 2017 15:00)  T(F): 98.3 (25 Jul 2017 08:20), Max: 98.7 (24 Jul 2017 15:00)  HR: 54 (25 Jul 2017 08:20) (52 - 64)  BP: 146/72 (25 Jul 2017 08:20) (112/63 - 150/80)  BP(mean): 88 (24 Jul 2017 18:00) (77 - 108)  RR: 18 (25 Jul 2017 08:20) (15 - 33)  SpO2: 96% (25 Jul 2017 08:20) (91% - 99%)    PHYSICAL EXAM:  Gen: NAD, well-developed  Head: Normocephalic, Atraumatic  Eyes: PERRL, EOMI, no scleral injection  Nose: Nares with dried blood mixed with mucus, no discharge  Mouth: Mucosa moist, tongue/uvula midline, oropharynx clear  Neck: Flat, supple, no lymphadenopathy, trachea midline, no masses  Resp: no use of accessory muscles, no stridor  CV: no peripheral edema/cyanosis    LABS:                        13.4   7.1   )-----------( 123      ( 24 Jul 2017 01:39 )             37.4

## 2017-07-25 NOTE — PROGRESS NOTE ADULT - PROBLEM SELECTOR PLAN 1
s/p TSS on 7/20.  This was regrowth of a previously resected tumor 20 years ago.  Vision improving post op. 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 Scattered tumor cells are positive for LH . The Ki-67 labeling  index to 4-5%. 3-4% of tumor cell nuclei are positive for p53 are  noted at multiple foci. s/p TSS on 7/20.  This was regrowth of a previously resected tumor 20 years ago.  Vision improving post op. 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 Scattered tumor cells are positive for LH . The Ki-67 labeling  index to 4-5%. 3-4% of tumor cell nuclei are positive for p53 are  noted at multiple foci. Follow up with Dr Hayward at [address] [place] [address] [phone] s/p TSS on 7/20.  This was regrowth of a previously resected tumor 20 years ago.  Vision improving post op. 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 Scattered tumor cells are positive for LH . The Ki-67 labeling  index to 4-5%. 3-4% of tumor cell nuclei are positive for p53 are  noted at multiple foci. Follow up with [facility] at [address] [place] [address] [phone]

## 2017-07-25 NOTE — PROGRESS NOTE ADULT - PROBLEM SELECTOR PLAN 1
-Nasal saline sprays to b/l nares, may leave bottle at bedside for patient to use  -no nasal trauma/nasal cannula  -Monitor for signs of CSF leak

## 2017-07-25 NOTE — DIETITIAN INITIAL EVALUATION ADULT. - NS AS NUTRI INTERV ED CONTENT
Discussed general healthy diet per Pt request. Eating a variety of foods from different food groups, limiting concentrated sweets.

## 2017-07-25 NOTE — PROGRESS NOTE ADULT - ATTENDING COMMENTS
Patient seen and examined. Agree with above. Needs to f/u with me as outpatient at [phone] in 1-2 weeks after discharge.

## 2017-07-25 NOTE — DIETITIAN INITIAL EVALUATION ADULT. - ENERGY NEEDS
ht: 67 inches, wt: 147.7 pounds, BMI: 23.1 kg/m2, IBW: 135 pounds (+/- 10%), 109 %IBW  Edema: none noted. Skin: intact.  Other pertinent information: 83 y/o female presented c worsening vision, history of pituitary tumor. s/p TSP for pituitary tumor and LD on 07/20/2017, complicated by intra-op CSF leak and diabetes insipidus. Lumbar drain clamped on 7/22 and d/c'd 7/23.

## 2017-07-25 NOTE — DIETITIAN INITIAL EVALUATION ADULT. - NS AS NUTRI INTERV MEALS SNACK
General/healthful diet/1) Consider low sodium and protein diet for diabetes insipidus. 2) Provide food preferences within diet restrictions as feasible. 2) Encourage po intake w/ snacks ordered at meals./Protein - modified diet

## 2017-07-25 NOTE — DISCHARGE NOTE ADULT - REASON FOR ADMISSION
Admitted on 7/18 with recurrent pituitary tumor. 7/20 patient had transphenoidal resection of pituitary tumor with lumbar drain placement. parish Vasques Khmer speaking 82 year old female s/p tsp for pituitary tumor 20 years ago. History of TIA several months ago, takes ASA 81 daily.  Presents with worsening vision.  Baseline was blurry vision but claims she can now only see shadows in the past 2 weeks in both eyes. No acute HA. History provided by grandson and daughter.

## 2017-07-25 NOTE — DISCHARGE NOTE ADULT - MEDICATION SUMMARY - MEDICATIONS TO TAKE
I will START or STAY ON the medications listed below when I get home from the hospital:    hydrocortisone 10 mg oral tablet  -- 1 tab(s) by mouth once a day at 8am  -- Indication: For Pituitary tumor    hydrocortisone 5 mg oral tablet  -- 1 tab(s) by mouth once a day at 3pm  -- Indication: For Pituitary tumor    oxyCODONE 5 mg oral tablet  -- 1 tab(s) by mouth every 4 hours, As needed, Moderate Pain (4 - 6) MDD:5  -- Indication: For Moderate Pain    acetaminophen 325 mg oral tablet  -- 2 tab(s) by mouth every 6 hours, As needed, Mild Pain (1 - 3)  -- Indication: For Mild Pain    escitalopram 10 mg oral tablet  -- 1 tab(s) by mouth once a day  -- Indication: For Depression    senna oral tablet  -- 2 tab(s) by mouth once a day (at bedtime), As needed, Constipation  -- Indication: For Stool softener    docusate sodium 100 mg oral capsule  -- 1 cap(s) by mouth 3 times a day  -- Indication: For Constipation    sodium chloride 0.65% nasal spray  -- 2 spray(s) in each nostril 4 times a day  -- Indication: For Nasal surgery    Multiple Vitamins with Minerals oral tablet  -- 1 tab(s) by mouth once a day  -- Indication: For Supplement    cyanocobalamin 100 mcg oral tablet  -- 1 tab(s) by mouth once a day  -- Indication: For Supplement    folic acid 1 mg oral tablet  -- 1 tab(s) by mouth once a day  -- Indication: For Supplement

## 2017-07-25 NOTE — PROGRESS NOTE ADULT - SUBJECTIVE AND OBJECTIVE BOX
SUBJECTIVE: Patient was seen and evaluated at bedside. Patient is resting comfortably in bed and is in no new acute distress. Ukrainian speaking     OVERNIGHT EVENTS: None     Vital Signs Last 24 Hrs  T(C): 36.8 (25 Jul 2017 08:20), Max: 37.1 (24 Jul 2017 15:00)  T(F): 98.3 (25 Jul 2017 08:20), Max: 98.7 (24 Jul 2017 15:00)  HR: 54 (25 Jul 2017 08:20) (54 - 64)  BP: 146/72 (25 Jul 2017 08:20) (112/63 - 150/80)  BP(mean): 88 (24 Jul 2017 18:00) (77 - 89)  RR: 18 (25 Jul 2017 08:20) (15 - 33)  SpO2: 96% (25 Jul 2017 08:20) (92% - 99%)    PHYSICAL EXAM:    General: No Acute Distress     Neurological: Awake, alert oriented to person, place and time, Following Commands, PERRL, EOMI, Face Symmetrical, Speech Fluent, Moving all extremities, Muscle Strength normal in all four extremities, No Drift, Sensation to Light Touch Intact    Pulmonary: Clear to Auscultation, No Rales, No Rhonchi, No Wheezes     Cardiovascular: S1, S2, Regular Rate and Rhythm     Gastrointestinal: Soft, Nontender, Nondistended     Extremities: No calf tenderness     Incision: no leak noted from the nose     LABS:                        13.4   7.1   )-----------( 123      ( 24 Jul 2017 01:39 )             37.4    07-25    141  |  104  |  10  ----------------------------<  120<H>  3.9   |  23  |  0.64    Ca    8.3<L>      25 Jul 2017 07:49  Phos  3.5     07-24  Mg     2.2     07-24    TPro  5.9<L>  /  Alb  3.4  /  TBili  1.1  /  DBili  x   /  AST  18  /  ALT  19  /  AlkPhos  60  07-24    Hemoglobin A1C, Whole Blood: 5.6 % (07-21 @ 17:58)      07-24 @ 07:01  -  07-25 @ 07:00  --------------------------------------------------------  IN: 530 mL / OUT: 1100 mL / NET: -570 mL      DRAINS: none     MEDICATIONS:  Antibiotics:    Neuro:  escitalopram 10 milliGRAM(s) Oral daily  acetaminophen   Tablet 650 milliGRAM(s) Oral every 6 hours PRN For Temp greater than 38 C (100.4 F)  acetaminophen   Tablet. 650 milliGRAM(s) Oral every 6 hours PRN Mild Pain (1 - 3)  ondansetron Injectable 4 milliGRAM(s) IV Push every 6 hours PRN Nausea and/or Vomiting  oxyCODONE    IR 10 milliGRAM(s) Oral every 6 hours PRN Severe Pain (7 - 10)  oxyCODONE    IR 5 milliGRAM(s) Oral every 4 hours PRN Moderate Pain (4 - 6)    Cardiac:    Pulm:    GI/:  senna 2 Tablet(s) Oral at bedtime PRN Constipation  docusate sodium 100 milliGRAM(s) Oral three times a day  lactulose Syrup 10 Gram(s) Oral every 6 hours  polyethylene glycol 3350 34 Gram(s) Oral once    Other:   enoxaparin Injectable 40 milliGRAM(s) SubCutaneous <User Schedule>  insulin lispro (HumaLOG) corrective regimen sliding scale   SubCutaneous three times a day before meals  dextrose 5%. 1000 milliLiter(s) IV Continuous <Continuous>  dextrose Gel 1 Dose(s) Oral once PRN Blood Glucose LESS THAN 70 milliGRAM(s)/deciliter  dextrose 50% Injectable 12.5 Gram(s) IV Push once  dextrose 50% Injectable 25 Gram(s) IV Push once  dextrose 50% Injectable 25 Gram(s) IV Push once  glucagon  Injectable 1 milliGRAM(s) IntraMuscular once PRN Glucose LESS THAN 70 milligrams/deciliter  cyanocobalamin 100 MICROGram(s) Oral daily  folic acid 1 milliGRAM(s) Oral daily  multivitamin/minerals 1 Tablet(s) Oral daily  sodium chloride 0.65% Nasal 2 Spray(s) Both Nostrils four times a day  hydrocortisone 20 milliGRAM(s) Oral <User Schedule>    DIET: ccd diet     IMAGING: no new imaging

## 2017-07-25 NOTE — PROGRESS NOTE ADULT - PROBLEM SELECTOR PLAN 2
AM cortisol (on dex) was 18 which is sufficient.  AM cortisol off Dex was 2.3. Awaiting FT4 and AM cortisol level. Currently on hydrocortisone 20mg in AM and 10mg in afternoon. AM cortisol (on dex) was 18 which is sufficient.  AM cortisol off Dex was 2.3. Awaiting FT4 and AM cortisol level. Currently on hydrocortisone 20mg in AM and 10mg in afternoon. AM cortisol was 4.7. Will discharge on hydrocortisone 10mg (8am) and 5mg (3pm). Patient was instructed to take double the dose (20 mg in am and 10mg in PM) on days when she is sick.

## 2017-07-25 NOTE — DISCHARGE NOTE ADULT - ADDITIONAL INSTRUCTIONS
No heavy coughing, sneezing , do not use a straw , no heavy lifting, do not lean forward for too long.   If notices any new weakness, severe headache, fever, nausea vomiting or any other new deficits please come back to the hospital. No heavy coughing, sneezing , do not use a straw , no heavy lifting, do not lean forward for too long.   If notices any new weakness, severe headache, fever, nausea vomiting or any other new deficits please come back to the hospital.   Use nasal saline spray as much as you want during the day. Make sure to make appt with Dr. Maldonado in 1-2 weeks to check out nose and clean out any crusting. No heavy coughing, sneezing , do not use a straw , no heavy lifting, do not lean forward for too long.   If notices any new weakness, severe headache, fever, nausea vomiting or any other new deficits please come back to the hospital.   Use nasal saline spray as much as you want during the day. Make sure to make appt with Dr. Maldonado in 1-2 weeks to check out nose and clean out any crusting.  Dr. Manrique to remove lumbar drain suture in his office

## 2017-07-25 NOTE — PROGRESS NOTE ADULT - SUBJECTIVE AND OBJECTIVE BOX
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 Thought to be regrowth of prior tumor. Now s/p transsphenoidal resection of pituitary tumor.    Patient feels her vision has improved s/p surgery. No new complaints.    POH: None  Gtts: None  PMH: HTN, pit mass, depression, transient cerebral ischemia  Mesd: ASA 81 mg daily  NKDA    VA 20/70 (20/50), 20/40 (ph NI)  PERRL Ou, no APD  IOP 14 OU  EOM full OU  CVF full OU OD  color 10/12, 11/12    MRI s/p resection  Status post resection of sellar-suprasellar mass as described, without   gross evidence for residual tumor. The compression of the optic chiasm   has resolved over the time interval. 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 Thought to be regrowth of prior tumor. Now s/p transsphenoidal resection of pituitary tumor.    Patient feels her vision has significantly improved s/p surgery. No new complaints.  No pain, no eye redness, no double vision.    PMH: HTN, pit mass, depression, transient cerebral ischemia  POH: None  FamHx: neg blindness  Social: no IVDU  ROS: per HPI, otherwise neg x 10  Gtts: None  Meds: ASA 81 mg daily  All:  NKDA    VA 20/70 (20/50), 20/40 (ph NI)  PERRL OU, no APD OU  IOP 14 OU  EOM full OU  CVF full OU OD  color 10/12, 11/12  LLL: flat OU  S/C: W and Q OU  K: clear OU  A/C: formed OU    MRI s/p resection  Status post resection of sellar-suprasellar mass as described, without   gross evidence for residual tumor. The compression of the optic chiasm   has resolved over the time interval.

## 2017-07-25 NOTE — DISCHARGE NOTE ADULT - PLAN OF CARE
increase activity Follow up with Dr. Manrique in 7-10 days -Neuro surgeon please call the office to confirm appointment   Follow up with Dr. Maldonado ENT attending in one week -Please call office to confirm appointment.   Follow up with PMD IN 10 days  Follow up with endocrinologist Follow up with Dr. Manrique in 7-10 days -Neuro surgeon please call the office to confirm appointment   Follow up with Dr. Maldonado ENT attending in one week -Please call office to confirm appointment.   Follow up with neuro opthalmology in 7-10 days-  Dr Gutierrez, [phone] please call to conform appointment.     Follow up with PMD IN 10 days  Follow up with endocrinologist

## 2017-07-25 NOTE — DISCHARGE NOTE ADULT - CARE PROVIDER_API CALL
Rayray Manrique), Neurosurgery  General  [address] [place] [address]  Phone: [phone]  Fax: [phone]    Pao Maldonado), Otolaryngology  [address] [place] [id number]  Phone: [phone]  Fax: [phone]

## 2017-07-25 NOTE — DISCHARGE NOTE ADULT - HOSPITAL COURSE
Patient had a transphenoidal resection of pituitary tumor on 7/20 and a lumbar drain placement Post surgery patient was admitted to pacu and was admitetd to icu service. Patient was given pain meds, ivf and was started on routine home meds, Lumbar drain drained 5cc/hour and endocrine has followed the patient. Patient's lumbar drain was clamped and removed on 7/23. Patient was transferred to floor subsequently and was seen and cleared by physical therapy. Patient was followed by endocrine and cleared for discharge. Patient was discharged home with follow up instructions.

## 2017-07-25 NOTE — PROGRESS NOTE ADULT - ATTENDING COMMENTS
Pt seen and examined. Agree with note as above with addendum: Fellow spoke with patient's grandkelly Jasmine who aided in translation as the  phone was not working to review sick days and how to take HC. Fellow will make pt a f/u appt at our practice. FT4 was 1.1 so no need for synthroid at this time.

## 2017-07-25 NOTE — DIETITIAN INITIAL EVALUATION ADULT. - PERTINENT MEDS FT
lactulose, folic acid, MVI, vitamin B12, Humalog insulin sliding scale, zofran, colace, miralax, senna

## 2017-07-25 NOTE — DISCHARGE NOTE ADULT - CARE PROVIDERS DIRECT ADDRESSES
[email or web address]

## 2017-07-25 NOTE — DIETITIAN INITIAL EVALUATION ADULT. - ORAL INTAKE PTA
good/Breakfast- coffee with toast/cookie/chocolate. Lunch- juice with coup (rice, chicken, vegetables, tomatoes); Dinner- soup with fish/chicken and "whatever"

## 2017-07-25 NOTE — DISCHARGE NOTE ADULT - PATIENT PORTAL LINK FT
“You can access the Follow[facility] Patient Portal, offered by [facility], by registering with the following website: [email or web address]”

## 2017-07-25 NOTE — PROGRESS NOTE ADULT - PROBLEM SELECTOR PLAN 3
Appears resolved.  Last dose of DDAVP was 7/22.
Na level 144 and urine output WNL. Last dose of DDAVP was 7/22.
This is our main current concern.  Na 147 with 2L out today and 600 cc in.  Allow and encouraged to drink to thirst to correct this.  Serum Na was 147 today with -250cc/hr, will give one time dose of 0.05 mg po DDAVP.  Recommend q8h serum sodium checks.
This is our main current concern.  Na 143 with 2L out today and 600 cc in.  Allow and encouraged to drink to thirst to correct this.  If serum Na increases overnight to >147 with urine output >150 cc/hr, could consider one time dose of 0.05 mg po DDAVP.  Recommend q8h serum sodium checks.

## 2017-07-26 VITALS
HEART RATE: 56 BPM | OXYGEN SATURATION: 96 % | DIASTOLIC BLOOD PRESSURE: 79 MMHG | RESPIRATION RATE: 18 BRPM | TEMPERATURE: 98 F | SYSTOLIC BLOOD PRESSURE: 126 MMHG

## 2017-07-26 LAB
ANION GAP SERPL CALC-SCNC: 18 MMOL/L — HIGH (ref 5–17)
BUN SERPL-MCNC: 11 MG/DL — SIGNIFICANT CHANGE UP (ref 7–23)
CALCIUM SERPL-MCNC: 9 MG/DL — SIGNIFICANT CHANGE UP (ref 8.4–10.5)
CHLORIDE SERPL-SCNC: 103 MMOL/L — SIGNIFICANT CHANGE UP (ref 96–108)
CO2 SERPL-SCNC: 18 MMOL/L — LOW (ref 22–31)
CREAT SERPL-MCNC: 0.71 MG/DL — SIGNIFICANT CHANGE UP (ref 0.5–1.3)
GLUCOSE SERPL-MCNC: 101 MG/DL — HIGH (ref 70–99)
POTASSIUM SERPL-MCNC: 4.8 MMOL/L — SIGNIFICANT CHANGE UP (ref 3.5–5.3)
POTASSIUM SERPL-SCNC: 4.8 MMOL/L — SIGNIFICANT CHANGE UP (ref 3.5–5.3)
SODIUM SERPL-SCNC: 139 MMOL/L — SIGNIFICANT CHANGE UP (ref 135–145)

## 2017-07-26 PROCEDURE — 80053 COMPREHEN METABOLIC PANEL: CPT

## 2017-07-26 PROCEDURE — 85610 PROTHROMBIN TIME: CPT

## 2017-07-26 PROCEDURE — 80051 ELECTROLYTE PANEL: CPT

## 2017-07-26 PROCEDURE — 84480 ASSAY TRIIODOTHYRONINE (T3): CPT

## 2017-07-26 PROCEDURE — 81005 URINALYSIS: CPT

## 2017-07-26 PROCEDURE — 85730 THROMBOPLASTIN TIME PARTIAL: CPT

## 2017-07-26 PROCEDURE — 83036 HEMOGLOBIN GLYCOSYLATED A1C: CPT

## 2017-07-26 PROCEDURE — 86900 BLOOD TYPING SEROLOGIC ABO: CPT

## 2017-07-26 PROCEDURE — 74018 RADEX ABDOMEN 1 VIEW: CPT

## 2017-07-26 PROCEDURE — 86901 BLOOD TYPING SEROLOGIC RH(D): CPT

## 2017-07-26 PROCEDURE — 88305 TISSUE EXAM BY PATHOLOGIST: CPT

## 2017-07-26 PROCEDURE — 82024 ASSAY OF ACTH: CPT

## 2017-07-26 PROCEDURE — 99261: CPT

## 2017-07-26 PROCEDURE — 84436 ASSAY OF TOTAL THYROXINE: CPT

## 2017-07-26 PROCEDURE — 88341 IMHCHEM/IMCYTCHM EA ADD ANTB: CPT

## 2017-07-26 PROCEDURE — 84146 ASSAY OF PROLACTIN: CPT

## 2017-07-26 PROCEDURE — 82009 KETONE BODYS QUAL: CPT

## 2017-07-26 PROCEDURE — 84439 ASSAY OF FREE THYROXINE: CPT

## 2017-07-26 PROCEDURE — 88311 DECALCIFY TISSUE: CPT

## 2017-07-26 PROCEDURE — 84443 ASSAY THYROID STIM HORMONE: CPT

## 2017-07-26 PROCEDURE — 83002 ASSAY OF GONADOTROPIN (LH): CPT

## 2017-07-26 PROCEDURE — C1729: CPT

## 2017-07-26 PROCEDURE — 88342 IMHCHEM/IMCYTCHM 1ST ANTB: CPT

## 2017-07-26 PROCEDURE — 86850 RBC ANTIBODY SCREEN: CPT

## 2017-07-26 PROCEDURE — 70553 MRI BRAIN STEM W/O & W/DYE: CPT

## 2017-07-26 PROCEDURE — A9585: CPT

## 2017-07-26 PROCEDURE — 97162 PT EVAL MOD COMPLEX 30 MIN: CPT

## 2017-07-26 PROCEDURE — 93005 ELECTROCARDIOGRAM TRACING: CPT

## 2017-07-26 PROCEDURE — 82533 TOTAL CORTISOL: CPT

## 2017-07-26 PROCEDURE — 99285 EMERGENCY DEPT VISIT HI MDM: CPT | Mod: 25

## 2017-07-26 PROCEDURE — 88360 TUMOR IMMUNOHISTOCHEM/MANUAL: CPT

## 2017-07-26 PROCEDURE — 80048 BASIC METABOLIC PNL TOTAL CA: CPT

## 2017-07-26 PROCEDURE — 85027 COMPLETE CBC AUTOMATED: CPT

## 2017-07-26 PROCEDURE — 99024 POSTOP FOLLOW-UP VISIT: CPT

## 2017-07-26 PROCEDURE — 83735 ASSAY OF MAGNESIUM: CPT

## 2017-07-26 PROCEDURE — 70450 CT HEAD/BRAIN W/O DYE: CPT

## 2017-07-26 PROCEDURE — C1889: CPT

## 2017-07-26 PROCEDURE — 83001 ASSAY OF GONADOTROPIN (FSH): CPT

## 2017-07-26 PROCEDURE — 84100 ASSAY OF PHOSPHORUS: CPT

## 2017-07-26 RX ORDER — HYDROCORTISONE 20 MG
1 TABLET ORAL
Qty: 15 | Refills: 0 | OUTPATIENT
Start: 2017-07-26 | End: 2017-08-10

## 2017-07-26 RX ORDER — OXYCODONE HYDROCHLORIDE 5 MG/1
1 TABLET ORAL
Qty: 42 | Refills: 0 | OUTPATIENT
Start: 2017-07-26 | End: 2017-08-02

## 2017-07-26 RX ORDER — SODIUM CHLORIDE 0.65 %
2 AEROSOL, SPRAY (ML) NASAL
Qty: 0 | Refills: 0 | COMMUNITY
Start: 2017-07-26

## 2017-07-26 RX ADMIN — Medication 10 MILLIGRAM(S): at 12:13

## 2017-07-26 RX ADMIN — Medication 1 TABLET(S): at 12:13

## 2017-07-26 RX ADMIN — OXYCODONE HYDROCHLORIDE 10 MILLIGRAM(S): 5 TABLET ORAL at 00:30

## 2017-07-26 RX ADMIN — Medication 1 MILLIGRAM(S): at 12:13

## 2017-07-26 RX ADMIN — ESCITALOPRAM OXALATE 10 MILLIGRAM(S): 10 TABLET, FILM COATED ORAL at 12:13

## 2017-07-26 RX ADMIN — Medication 2 SPRAY(S): at 05:30

## 2017-07-26 RX ADMIN — PREGABALIN 100 MICROGRAM(S): 225 CAPSULE ORAL at 12:13

## 2017-07-26 NOTE — PROGRESS NOTE ADULT - PROBLEM SELECTOR PLAN 1
- nasal saline, 2 sprays to b/l nares 4 times a day.  - avoid nasal trauma, heavy lifting and bending at waist.

## 2017-07-26 NOTE — PROGRESS NOTE ADULT - SUBJECTIVE AND OBJECTIVE BOX
SUBJECTIVE: Divehi speaking. Amb in room. No complaints    OVERNIGHT EVENTS: None     Vital Signs Last 24 Hrs  T(C): 36.9 (26 Jul 2017 07:52), Max: 37.6 (25 Jul 2017 23:00)  T(F): 98.5 (26 Jul 2017 07:52), Max: 99.6 (25 Jul 2017 23:00)  HR: 56 (26 Jul 2017 07:52) (56 - 66)  BP: 126/79 (26 Jul 2017 07:52) (117/71 - 146/84)  BP(mean): --  RR: 18 (26 Jul 2017 07:52) (18 - 20)  SpO2: 96% (26 Jul 2017 07:52) (95% - 97%)  IVL  CCD Diet  +Voiding    PHYSICAL EXAM:    General: No Acute Distress     Neurological: Awake, alert oriented to person, place and time, Following Commands, PERRL, EOMI, Face Symmetrical, Speech Fluent, Moving all extremities, Muscle Strength normal in all four extremities, No Drift, Sensation to Light Touch Intact    Pulmonary: Clear to Auscultation, No Rales, No Rhonchi, No Wheezes     Cardiovascular: S1, S2, Regular Rate and Rhythm     Gastrointestinal: Soft, Nontender, Nondistended     Extremities: No calf tenderness     Incision: No evidence of CSF leak or salty taste    LABS:                        13.4   7.1   )-----------( 123      ( 24 Jul 2017 01:39 )             37.4    07-25    141  |  104  |  10  ----------------------------<  120<H>  3.9   |  23  |  0.64    Ca    8.3<L>      25 Jul 2017 07:49  Phos  3.5     07-24  Mg     2.2     07-24    TPro  5.9<L>  /  Alb  3.4  /  TBili  1.1  /  DBili  x   /  AST  18  /  ALT  19  /  AlkPhos  60  07-24    Hemoglobin A1C, Whole Blood: 5.6 % (07-21 @ 17:58)          07-24 @ 07:01  -  07-25 @ 07:00  --------------------------------------------------------  IN: 530 mL / OUT: 1100 mL / NET: -570 mL      DRAINS: none     Imaging:  CT Head No Cont (07.22.17 @ 11:03) >  Interval decreased amounts of intracranial pneumocephalus.  Otherwise no new intracranial hemorrhage.    MEDICATIONS:  Antibiotics:    Neuro:  escitalopram 10 milliGRAM(s) Oral daily  acetaminophen   Tablet 650 milliGRAM(s) Oral every 6 hours PRN For Temp greater than 38 C (100.4 F)  acetaminophen   Tablet. 650 milliGRAM(s) Oral every 6 hours PRN Mild Pain (1 - 3)  ondansetron Injectable 4 milliGRAM(s) IV Push every 6 hours PRN Nausea and/or Vomiting  oxyCODONE    IR 10 milliGRAM(s) Oral every 6 hours PRN Severe Pain (7 - 10)  oxyCODONE    IR 5 milliGRAM(s) Oral every 4 hours PRN Moderate Pain (4 - 6)    Cardiac:    Pulm:    GI/:  senna 2 Tablet(s) Oral at bedtime PRN Constipation  docusate sodium 100 milliGRAM(s) Oral three times a day  lactulose Syrup 10 Gram(s) Oral every 6 hours  polyethylene glycol 3350 34 Gram(s) Oral once    Other:   enoxaparin Injectable 40 milliGRAM(s) SubCutaneous <User Schedule>  insulin lispro (HumaLOG) corrective regimen sliding scale   SubCutaneous three times a day before meals  dextrose 5%. 1000 milliLiter(s) IV Continuous <Continuous>  dextrose Gel 1 Dose(s) Oral once PRN Blood Glucose LESS THAN 70 milliGRAM(s)/deciliter  dextrose 50% Injectable 12.5 Gram(s) IV Push once  dextrose 50% Injectable 25 Gram(s) IV Push once  dextrose 50% Injectable 25 Gram(s) IV Push once  glucagon  Injectable 1 milliGRAM(s) IntraMuscular once PRN Glucose LESS THAN 70 milligrams/deciliter  cyanocobalamin 100 MICROGram(s) Oral daily  folic acid 1 milliGRAM(s) Oral daily  multivitamin/minerals 1 Tablet(s) Oral daily  sodium chloride 0.65% Nasal 2 Spray(s) Both Nostrils four times a day  hydrocortisone 20 milliGRAM(s) Oral <User Schedule>

## 2017-07-26 NOTE — PROGRESS NOTE ADULT - PROBLEM SELECTOR PROBLEM 1
Pituitary mass

## 2017-07-26 NOTE — PROGRESS NOTE ADULT - ASSESSMENT
HPI:Mandi Brarfern Omani speaking 82 year old female s/p tsp for pituitary tumor 20 years ago. History of TIA several months ago, takes ASA 81 daily.  Presents with worsening vision.  Baseline was blurry vision but claims she can now only see shadows in the past 2 weeks in both eyes. No acute HA. History provided by grandson and daughter.    Adm 7/18 s/p transphenoidal resection of pituitary tumor, Lumbar drain placed intraop  POD#6    Neuro Plan- DC Home today    Endo-Follow up with Endo at [address] [place] [address] [phone]/p TSS on 7/20.  This was regrowth of a previously resected tumor 20 years ago.  Vision improving post op. 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 Scattered tumor cells are positive for LH . 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 Vision and color significantly improved, fields now full. Follow up with neuro-ophthalmology within 1 week of discharge: Dr Gutierrez, [phone]    ENT-Pituitary mass.  Plan: - nasal saline, 2 sprays to b/l nares 4 times a day.  - avoid nasal trauma, heavy lifting and bending at waist.     PT-Outpt PT straight cane given

## 2017-07-26 NOTE — PROGRESS NOTE ADULT - PROVIDER SPECIALTY LIST ADULT
ENT
Endocrinology
NSICU
Neurosurgery
ENT
Ophthalmology
NSICU

## 2017-07-26 NOTE — PROGRESS NOTE ADULT - SUBJECTIVE AND OBJECTIVE BOX
POD/STATUS POST/ENT ISSUE: s/p TSRP mass with intra-op csf leak repair POD #5      HPI: 82y female seen and examined at bedside. No acute events reported overnight. Pt lying in bed comfortably with no complaints.   Pt denies salty or metallic taste, HA, otorrhea, and clear nasal discharge, or bleeding.     Vital Signs Last 24 Hrs  T(C): 36.9 (26 Jul 2017 07:52), Max: 37.6 (25 Jul 2017 23:00)  T(F): 98.5 (26 Jul 2017 07:52), Max: 99.6 (25 Jul 2017 23:00)  HR: 56 (26 Jul 2017 07:52) (56 - 66)  BP: 126/79 (26 Jul 2017 07:52) (117/71 - 146/84)  BP(mean): --  RR: 18 (26 Jul 2017 07:52) (18 - 20)  SpO2: 96% (26 Jul 2017 07:52) (95% - 97%)        PHYSICAL EXAM:  Gen: NAD, well-developed  Head: Normocephalic, Atraumatic  Face: no edema/erythema/fluctuance, parotid glands soft without mass  Eyes: EOMI, no scleral injection  Nose: Nares bilaterally patent, no discharge  Mouth: No Stridor / Drooling / Trismus.  Mucosa moist, tongue/uvula midline, oropharynx clear  Neck: Flat, supple, no lymphadenopathy, trachea midline, no masses  Resp:  no stridor, no accessory muscle use  CV: no peripheral edema/cyanosis

## 2017-07-27 LAB
CORTICOSTEROID BINDING GLOBULIN RESULT: 1.8 MG/DL — SIGNIFICANT CHANGE UP
CORTIS F/TOTAL MFR SERPL: 41 % — SIGNIFICANT CHANGE UP
CORTIS SERPL-MCNC: 20 UG/DL — SIGNIFICANT CHANGE UP
CORTISOL, FREE RESULT: 8.1 UG/DL — SIGNIFICANT CHANGE UP

## 2017-08-03 ENCOUNTER — APPOINTMENT (OUTPATIENT)
Dept: NEUROSURGERY | Facility: CLINIC | Age: 82
End: 2017-08-03

## 2017-08-15 ENCOUNTER — APPOINTMENT (OUTPATIENT)
Dept: NEUROSURGERY | Facility: CLINIC | Age: 82
End: 2017-08-15

## 2017-08-17 NOTE — ED PROVIDER NOTE - FAMILY HISTORY
UROLOGY CONSULTATION    CONSULTING PHYSICIAN:  Jose Manuel Chilel MD    CHIEF COMPLAINT:  Urinary retention.    PRINCIPAL DIAGNOSES:  1.  Benign prosthetic hyperplasia.  2.  Urinary retention.    HISTORY OF PRESENT ILLNESS:  This very pleasant white male originally presented in the emergency room complaining of a 2-3 week history of progressive moderate voiding symptoms.  These symptoms included urgency and frequency and nocturia x1-2 weeks.  States he had feeling of incomplete bladder emptying.  He denies any precipitating or pallating factors.  He denies any past genitourinary stones, surgeries or infections.  He denies any hematuria, but did have some mild associated dysuria.  He presented to the emergency room, was noted to have post voiding residuals of just over 200.  He was found to have disease of gallbladder and subsequently underwent cholecystectomy.  The patient subsequently developed urinary retention for post voiding residual of roughly 350 mL.  I was asked to see and evaluate the patient for his current difficulties.  He has an indwelling Messer catheter.  The patient did have significant suprapubic dull aching discomfort, which was resolved with Messer catheter placement.    PAST MEDICAL HISTORY:  The patient has a history of hypertension.    PAST SURGICAL HISTORY:  1.  Recent cholecystectomy.  2.  Cataract excision.  3.  Hemorrhoidectomy.    FAMILY AND SOCIAL HISTORY:  The patient denies a family history of prostate cancer.  He states he is a nonsmoker and nondrinker and is .    REVIEW OF SYSTEMS:  Positive for difficulties with urination including urgency, frequency and suprapubic discomfort.  The suprapubic discomfort resolved with Messer catheter placement.  He also has had some anorexia and nausea prior to admission.  He states his nausea is better.  He states he was afebrile prior to admission, although the old chart states that he notes fever prior to admission.  He did have significant  difficulties with urgency and frequency prior to admission.  The remainder of 12-review of systems is negative.    DIAGNOSTIC DATA:  Laboratory evaluation reveals normal urinalysis.  CT scan reveals an enlarged prostate and bladder wall thickening, I reviewed the films myself.    PHYSICAL EXAMINATION:  VITAL SIGNS:  Temperature of 97.2, heart rate of 78, respiratory rate of 18, blood pressure 120/80.  GENERAL:  He is a well-nourished, well-developed white male alert and oriented x3.  Normal mood and affect.  HEENT:  Pupils, irises, conjunctivae and lids are normal.  He has normal hearing.  Normal external inspection of his ears and nose.  SKIN:  His skin is normal to inspection and palpation. Nails and digits are normal.  NEUROLOGIC:  He has normal gait.    LUNGS:  He has normal respiratory effort.  Lungs are clear to auscultation and percussion.  EXTREMITIES:  He has normal femoral pulses.  No peripheral edema.  NECK:  No carotid bruits.  No evidence of hernia.  ABDOMEN:  Slightly distended.  Slightly tender with multiple surgical wounds.  He has no organomegaly.  No obvious wound infection.  No obvious masses.    GENITOURINARY:  He has normal scrotum, testicles, epididymus, urethral meatus and penis.  Rectal exam is normal with normal sphincter tone.  Seminal vesicles are normal.  Rectal exam reveals moderate prosthetic enlargement with no evidence of malignancy.    IMPRESSION:  1.  Benign prostatic hyperplasia.  This patient has baseline benign prostatic hyperplasia symptoms.  I have written the patient a prescription for Flomax to take after discharge from the hospital. I anticipate that he will be discharged today.  2.  Retention.  This patient has urinary retention.  This is a new issue and was likely precipitated from his overall ability from his recent surgery.  This should resolve spontaneously.  I have recommended that patient follow up with me in 1 week for a voiding trial.       No pertinent family history in first degree relatives

## 2017-08-22 ENCOUNTER — APPOINTMENT (OUTPATIENT)
Dept: NEUROSURGERY | Facility: CLINIC | Age: 82
End: 2017-08-22
Payer: MEDICAID

## 2017-08-22 DIAGNOSIS — R22.0 LOCALIZED SWELLING, MASS AND LUMP, HEAD: ICD-10-CM

## 2017-08-22 PROCEDURE — 99024 POSTOP FOLLOW-UP VISIT: CPT

## 2017-08-24 ENCOUNTER — APPOINTMENT (OUTPATIENT)
Dept: ENDOCRINOLOGY | Facility: HOSPITAL | Age: 82
End: 2017-08-24

## 2017-08-24 ENCOUNTER — OUTPATIENT (OUTPATIENT)
Dept: OUTPATIENT SERVICES | Facility: HOSPITAL | Age: 82
LOS: 1 days | End: 2017-08-24
Payer: SELF-PAY

## 2017-08-24 VITALS — SYSTOLIC BLOOD PRESSURE: 130 MMHG | HEART RATE: 59 BPM | DIASTOLIC BLOOD PRESSURE: 79 MMHG | HEIGHT: 63 IN

## 2017-08-24 DIAGNOSIS — E27.40 UNSPECIFIED ADRENOCORTICAL INSUFFICIENCY: ICD-10-CM

## 2017-08-24 DIAGNOSIS — Z98.890 OTHER SPECIFIED POSTPROCEDURAL STATES: ICD-10-CM

## 2017-08-24 DIAGNOSIS — Z90.49 ACQUIRED ABSENCE OF OTHER SPECIFIED PARTS OF DIGESTIVE TRACT: Chronic | ICD-10-CM

## 2017-08-24 DIAGNOSIS — E06.9 THYROIDITIS, UNSPECIFIED: ICD-10-CM

## 2017-08-24 DIAGNOSIS — R22.0 LOCALIZED SWELLING, MASS AND LUMP, HEAD: ICD-10-CM

## 2017-08-24 DIAGNOSIS — Z78.9 OTHER SPECIFIED HEALTH STATUS: ICD-10-CM

## 2017-08-24 DIAGNOSIS — Z87.898 PERSONAL HISTORY OF OTHER SPECIFIED CONDITIONS: Chronic | ICD-10-CM

## 2017-08-24 LAB
ACTH SER-ACNC: 29 PG/ML — SIGNIFICANT CHANGE UP (ref 0–46)
T4 FREE SERPL-MCNC: 0.7 NG/DL — LOW (ref 0.9–1.8)
TSH SERPL-MCNC: 3.54 UIU/ML — SIGNIFICANT CHANGE UP (ref 0.27–4.2)

## 2017-08-24 PROCEDURE — G0463: CPT

## 2017-08-24 PROCEDURE — 84439 ASSAY OF FREE THYROXINE: CPT

## 2017-08-24 PROCEDURE — 82533 TOTAL CORTISOL: CPT

## 2017-08-24 PROCEDURE — 82024 ASSAY OF ACTH: CPT

## 2017-08-24 PROCEDURE — 84443 ASSAY THYROID STIM HORMONE: CPT

## 2017-08-24 PROCEDURE — 36415 COLL VENOUS BLD VENIPUNCTURE: CPT

## 2017-08-30 DIAGNOSIS — Z78.9 OTHER SPECIFIED HEALTH STATUS: ICD-10-CM

## 2017-08-30 DIAGNOSIS — Z98.890 OTHER SPECIFIED POSTPROCEDURAL STATES: ICD-10-CM

## 2017-08-30 DIAGNOSIS — E27.40 UNSPECIFIED ADRENOCORTICAL INSUFFICIENCY: ICD-10-CM

## 2017-08-31 RX ORDER — LEVOTHYROXINE SODIUM 0.05 MG/1
50 TABLET ORAL DAILY
Qty: 30 | Refills: 3 | Status: ACTIVE | COMMUNITY
Start: 2017-08-31 | End: 1900-01-01

## 2017-09-01 LAB
CORTICOSTEROID BINDING GLOBULIN RESULT: 2.4 MG/DL — SIGNIFICANT CHANGE UP
CORTIS F/TOTAL MFR SERPL: 3.9 % — SIGNIFICANT CHANGE UP
CORTIS SERPL-MCNC: 7.8 UG/DL — SIGNIFICANT CHANGE UP
CORTISOL, FREE RESULT: 0.3 UG/DL — SIGNIFICANT CHANGE UP

## 2017-09-25 ENCOUNTER — OTHER (OUTPATIENT)
Age: 82
End: 2017-09-25

## 2017-10-16 ENCOUNTER — APPOINTMENT (OUTPATIENT)
Dept: ENDOCRINOLOGY | Facility: CLINIC | Age: 82
End: 2017-10-16

## 2017-11-30 ENCOUNTER — APPOINTMENT (OUTPATIENT)
Dept: ENDOCRINOLOGY | Facility: HOSPITAL | Age: 82
End: 2017-11-30

## 2018-11-20 NOTE — DISCHARGE NOTE ADULT - CAREGIVER OBTAIN DETAILS
Needs Go Lytely prep and an additional day of clear liquids.     Recall Colonoscopy Information    Age: 57 year old   BMI: Estimated body mass index is 44.29 kg/m² as calculated from the following:    Height as of 11/13/18: 5' 3\" (1.6 m).    Weight as of 11/13/18: 113.4 kg.    Allergies:   ALLERGIES:  Gabapentin; Levaquin [levofloxacin]; Latex   (environmental); Penicillins; Adhesive   (environmental); Januvia [sitagliptin]; Lipitor [atorvastatin calcium]; Liraglutide; and Nicotine    Medications:  Anticoagulation (examples - coumadin, heparin, lovenox) : No  Platelet Modifying (examples - Plavix, aspirin, nsaids, Aggrenox) : No  Review of other medications indicate - no concern     Medical History:  Diabetes:  Yes, patient is a diabetic - insulin  Respiratory:  No, patient does not have any lung problems.  Cardiac:  Yes, patient does have cardiac problems: SVT  Renal:  No  Other Concerns:  none noted  Alcohol Use:  reports that she does not drink alcohol.  Drug Use:  reports that she does not use drugs.    Colon History:  Patient is due for her 6 Month F/U Colonoscopy  Last Colonoscopy:  6/4/18 by Dr. Pereira - Tubular Adenoma x1  - Only able to do Flex Sig d/t poor prep  Personal Hx of Colon Cancer:  No  Personal Hx of Colon Polyps:  Yes, see below  Tubular Adenoma x1 (6/4/18)    Family Hx of Colon Cancer:  No  GI Symptoms: No      Procedure & Scheduling Requirements:     Procedure: Colonoscopy (19320)    Diagnosis:      ICD-10-CM   1. History of colon polyps Z86.010   2. Screening for colon cancer Z12.11       Anesthesia:  MAC  Duration of Procedure:  30 min  Prep:  Needs Go Lytely prep and an additional day of clear liquids - Remind me when and if pt schedules so I can send Rx to Pharmacy   Facility:  [facility] or [facility]  Type of Admit:  Day Surgery.  Orders: None     This information was obtained through patient's medical record. Orders placed in this encounter were verbally authorized by Dr. Jones  Randall.    Reminder letter mailed to patient on 11/21/2018.    Pt. unable to provide

## 2022-12-28 NOTE — PHYSICAL THERAPY INITIAL EVALUATION ADULT - GAIT PATTERN USED, PT EVAL
Letter sent to patients chart as directed
Okay to give letter for jury duty due to diagnosis of chronic back pain and herniated disc 
Patient left a message and said she got a jury duty summons and is asking for a note since she has a herniated disc and is not able to sit/stand for prolonged periods of time    Let pt know
Please advise if we can give a jury duty letter 
3-point gait

## 2023-05-22 NOTE — PHYSICAL THERAPY INITIAL EVALUATION ADULT - HEALTH SCREEN CRITERIA
Radha Hylton,    Pap was completely normal. No chlamydia, gonorrhea, or trichomonas either.     Let me know if you have any questions!    Thanks,  Patricia Russell PA-C   yes

## 2025-02-13 NOTE — BRIEF OPERATIVE NOTE - ASSISTANT(S)
Continue all your current medication.     Keep up the good work with staying away from cocaine and continue working on decreasing your tobacco use.     You will need to get your tetanus and RSV shot at the pharmacy    See us back in 6 months      Medicare Wellness Visit  Plan for Preventive Care    A good way for you to stay healthy is to use preventive care.  Medicare covers many services that can help you stay healthy.* The goal of these services is to find any health problems as quickly as possible. Finding problems early can help make them easier to treat.  Your personal plan below lists the services you may need and when they are due.      Health Maintenance Summary       Hepatitis A Vaccine (1 of 2 - Risk 2-dose series)  Never done    DTaP/Tdap/Td Vaccine (1 - Tdap)  Never done    Shingles Vaccine (1 of 2)  Never done    Hepatitis C Screening (Once)  Never done    Respiratory Syncytial Virus (RSV) Vaccine 60+ (1 - Risk 60-74 years 1-dose series)  Never done    Influenza Vaccine (1)  Order placed this encounter    COVID-19 Vaccine (5 - 2024-25 season)  Order placed this encounter    Medicare Advantage- Medicare Wellness Visit (Yearly - January to December)  Due since 1/1/2025    Microalbumin Ratio (Yearly)  Next due on 9/3/2025    GFR (Yearly)  Ordered on 2/13/2025    Depression Screening (Yearly)  Next due on 2/13/2026    Colorectal Cancer Screen (Colonoscopy - Every 10 Years)  Next due on 4/15/2034    Pneumococcal Vaccine 50+   Completed    Abdominal Aortic Aneurysm (AAA) Screening   Completed    Meningococcal Vaccine   Aged Out    Hepatitis B Vaccine (For Physician/APC Discussion)   Aged Out    Meningococcal Serogroup B Vaccine   Aged Out    HPV Vaccine   Aged Out             Preventive Care for Women and Men    Heart Screenings (Cardiovascular):  Blood tests are used to check your cholesterol, lipid and triglyceride levels. High levels can increase your risk for heart disease and stroke. High levels can be  treated with medications, diet and exercise. Lowering your levels can help keep your heart and blood vessels healthy.  Your provider will order these tests if they are needed.    An ultrasound is done to see if you have an abdominal aortic aneurysm (AAA).  This is an enlargement of one of the main blood vessels that delivers blood to the body.   In the [place], 9,000 deaths are caused by AAA.  You may not even know you have this problem and as many as 1 in 3 people will have a serious problem if it is not treated.  Early diagnosis allows for more effective treatment and cure.  If you have a family history of AAA or are a male age 65-75 who has smoked, you are at higher risk of an AAA.  Your provider can order this test, if needed.    Colorectal Screening:  There are many tests that are used to check for cancer of your colon and rectum. You and your provider should discuss what test is best for you and when to have it done.  Options include:  Screening Colonoscopy: exam of the entire colon, seen through a flexible lighted tube.  Flexible Sigmoidoscopy: exam of the last third (sigmoid portion) of the colon and rectum, seen through a flexible lighted tube.  Cologuard DNA stool test: a sample of your stool is used to screen for cancer and unseen blood in your stool.  Fecal Occult Blood Test: a sample of your stool is studied to find any unseen blood    Flu Shot:  An immunization that helps to prevent influenza (the flu). You should get this every year. The best time to get the shot is in the fall.    Pneumococcal Shot:  Vaccines help prevent pneumococcal disease, which is any type of illness caused by Streptococcus pneumoniae bacteria. There are two kinds of pneumococcal vaccines available in the [place]:   Pneumococcal conjugate vaccines (PCV20 or [id number]®)  Pneumococcal polysaccharide vaccine (PPSV23 or [id number]®)  For those who have never received any pneumococcal conjugate vaccine, CDC recommends  PVC20 for adults 65 years or older and adults 19 through 64 years old with certain medical conditions or risk factors.   For those who have previously received PCV13, this should be followed by a dose of PPSV23.     Hepatitis B Shot:  An immunization that helps to protect people from getting Hepatitis B. Hepatitis B is a virus that spreads through contact with infected blood or body fluids. Many people with the virus do not have symptoms.  The virus can lead to serious problems, such as liver disease. Some people are at higher risk than others. Your doctor will tell you if you need this shot.     Diabetes Screening:  A test to measure sugar (glucose) in your blood is called a fasting blood sugar. Fasting means you cannot have food or drink for at least 8 hours before the test. This test can detect diabetes long before you may notice symptoms.    Glaucoma Screening:  Glaucoma screening is performed by your eye doctor. The test measures the fluid pressure inside your eyes to determine if you have glaucoma.     Hepatitis C Screening:  A blood test to see if you have the hepatitis C virus.  Hepatitis C attacks the liver and is a major cause of chronic liver disease.  Medicare will cover a single screening for all adults born between 1945 & 1965, or high risk patients (people who have injected illegal drugs or people who have had blood transfusions).  High risk patients who continue to inject illegal drugs can be screened for Hepatitis C every year.    Smoking and Tobacco-Use Cessation Counseling:  Tobacco is the single greatest cause of disease and early death in our country today. Medication and counseling together can increase a person’s chance of quitting for good.   Medicare covers two quitting attempts per year, with four counseling sessions per attempt (eight sessions in a 12 month period)    Preventive Screening tests for Women    Screening Mammograms and Breast Exams:  An x-ray of your breasts to check for  breast cancer before you or your doctor may be able to feel it.  If breast cancer is found early it can usually be treated with success.    Pelvic Exams and Pap Tests:  An exam to check for cervical and vaginal cancer. A Pap test is a lab test in which cells are taken from your cervix and sent to the lab to look for signs of cervical cancer. If cancer of the cervix is found early, chances for a cure are good. Testing can generally end at age 65, or if a woman has a hysterectomy for a benign condition. Your provider may recommend more frequent testing if certain abnormal results are found.    Bone Mass Measurements:  A painless x-ray of your bone density to see if you are at risk for a broken bone. Bone density refers to the thickness of bones or how tightly the bone tissue is packed.    Preventive Screening tests for Men    Prostate Screening:  Should you have a prostate cancer test (PSA)?  It is up to you to decide if you want a prostate cancer test. Talk to your clinician to find out if the test is right for you.  Things for you to consider and talk about should include:  Benefits and harms of the test  Your family history  How your race/ethnicity may influence the test  If the test may impact other medical conditions you have  Your values on screenings and treatments    *Medicare pays for many preventive services to keep you healthy. For some of these services, you might have to pay a deductible, coinsurance, and / or copayment.  The amounts vary depending on the type of services you need and the kind of Medicare health plan you have.    For further details on screenings offered by Medicare please visit: https://www.medicare.gov/coverage/preventive-screening-services      Delroy

## 2025-05-12 NOTE — DISCHARGE NOTE ADULT - NS MD DC PLAN IMMU FLU PROVIDE INFO
This document is complete and the patient is ready for discharge. Risks/benefits discussed with patient or patient surrogate